# Patient Record
Sex: FEMALE | Race: NATIVE HAWAIIAN OR OTHER PACIFIC ISLANDER | URBAN - METROPOLITAN AREA
[De-identification: names, ages, dates, MRNs, and addresses within clinical notes are randomized per-mention and may not be internally consistent; named-entity substitution may affect disease eponyms.]

---

## 2017-12-28 ENCOUNTER — OP HISTORICAL/CONVERTED ENCOUNTER (OUTPATIENT)
Dept: OTHER | Age: 72
End: 2017-12-28

## 2023-04-17 RX ORDER — SPIRONOLACTONE 25 MG/1
25 TABLET ORAL DAILY
COMMUNITY

## 2023-04-17 RX ORDER — CHOLECALCIFEROL TAB 125 MCG (5000 UNIT) 125 MCG
5000 TAB ORAL DAILY
COMMUNITY

## 2023-04-17 RX ORDER — METFORMIN HYDROCHLORIDE 500 MG/1
500 TABLET ORAL
COMMUNITY

## 2023-04-17 RX ORDER — ATORVASTATIN CALCIUM 20 MG/1
20 TABLET, FILM COATED ORAL DAILY
COMMUNITY

## 2023-04-17 RX ORDER — LEVOTHYROXINE SODIUM 50 UG/1
50 TABLET ORAL
COMMUNITY

## 2023-04-17 RX ORDER — PHENOL/SODIUM PHENOLATE
20 AEROSOL, SPRAY (ML) MUCOUS MEMBRANE DAILY
COMMUNITY

## 2023-04-17 RX ORDER — HYDROCHLOROTHIAZIDE 25 MG/1
25 TABLET ORAL DAILY
COMMUNITY

## 2023-04-17 RX ORDER — METOPROLOL TARTRATE 25 MG/1
25 TABLET, FILM COATED ORAL DAILY
COMMUNITY

## 2023-04-18 NOTE — PERIOP NOTES
Returned patient's call this morning pt verbalized understanding to hold Metformin & Eliquis for 2 days, she wanted to confirm it was OK to take metoprolol and levothyroxine morning of procedure.

## 2023-04-19 ENCOUNTER — HOSPITAL ENCOUNTER (OUTPATIENT)
Age: 78
Discharge: HOME | End: 2023-04-19
Attending: STUDENT IN AN ORGANIZED HEALTH CARE EDUCATION/TRAINING PROGRAM | Admitting: STUDENT IN AN ORGANIZED HEALTH CARE EDUCATION/TRAINING PROGRAM
Payer: MEDICARE

## 2023-04-19 VITALS
WEIGHT: 200 LBS | BODY MASS INDEX: 34.15 KG/M2 | DIASTOLIC BLOOD PRESSURE: 66 MMHG | RESPIRATION RATE: 18 BRPM | HEART RATE: 64 BPM | HEIGHT: 64 IN | OXYGEN SATURATION: 95 % | SYSTOLIC BLOOD PRESSURE: 147 MMHG

## 2023-04-19 DIAGNOSIS — I48.91 ATRIAL FIBRILLATION, UNSPECIFIED TYPE (HCC): ICD-10-CM

## 2023-04-19 LAB
ALBUMIN SERPL-MCNC: 3.8 G/DL (ref 3.5–5)
ALBUMIN/GLOB SERPL: 1.1 (ref 1.1–2.2)
ALP SERPL-CCNC: 79 U/L (ref 45–117)
ALT SERPL-CCNC: 30 U/L (ref 12–78)
ANION GAP SERPL CALC-SCNC: 3 MMOL/L (ref 5–15)
APTT PPP: 27.8 SEC (ref 21.2–34.1)
AST SERPL W P-5'-P-CCNC: 17 U/L (ref 15–37)
ATRIAL RATE: 58 BPM
BASOPHILS # BLD: 0 K/UL (ref 0–0.1)
BASOPHILS NFR BLD: 0 % (ref 0–1)
BILIRUB SERPL-MCNC: 1 MG/DL (ref 0.2–1)
BUN SERPL-MCNC: 20 MG/DL (ref 6–20)
BUN/CREAT SERPL: 31 (ref 12–20)
CA-I BLD-MCNC: 9.3 MG/DL (ref 8.5–10.1)
CALCULATED P AXIS, ECG09: -9 DEGREES
CALCULATED R AXIS, ECG10: 3 DEGREES
CALCULATED T AXIS, ECG11: 31 DEGREES
CHLORIDE SERPL-SCNC: 103 MMOL/L (ref 97–108)
CO2 SERPL-SCNC: 32 MMOL/L (ref 21–32)
CREAT SERPL-MCNC: 0.64 MG/DL (ref 0.55–1.02)
DIAGNOSIS, 93000: NORMAL
DIFFERENTIAL METHOD BLD: NORMAL
EOSINOPHIL # BLD: 0.1 K/UL (ref 0–0.4)
EOSINOPHIL NFR BLD: 2 % (ref 0–7)
ERYTHROCYTE [DISTWIDTH] IN BLOOD BY AUTOMATED COUNT: 14.2 % (ref 11.5–14.5)
GLOBULIN SER CALC-MCNC: 3.5 G/DL (ref 2–4)
GLUCOSE SERPL-MCNC: 113 MG/DL (ref 65–100)
HCT VFR BLD AUTO: 37.9 % (ref 35–47)
HGB BLD-MCNC: 12.4 G/DL (ref 11.5–16)
IMM GRANULOCYTES # BLD AUTO: 0 K/UL (ref 0–0.04)
IMM GRANULOCYTES NFR BLD AUTO: 0 % (ref 0–0.5)
INR PPP: 1 (ref 0.9–1.1)
LYMPHOCYTES # BLD: 1.3 K/UL (ref 0.8–3.5)
LYMPHOCYTES NFR BLD: 30 % (ref 12–49)
MCH RBC QN AUTO: 29 PG (ref 26–34)
MCHC RBC AUTO-ENTMCNC: 32.7 G/DL (ref 30–36.5)
MCV RBC AUTO: 88.6 FL (ref 80–99)
MONOCYTES # BLD: 0.3 K/UL (ref 0–1)
MONOCYTES NFR BLD: 8 % (ref 5–13)
NEUTS SEG # BLD: 2.5 K/UL (ref 1.8–8)
NEUTS SEG NFR BLD: 60 % (ref 32–75)
NRBC # BLD: 0 K/UL (ref 0–0.01)
NRBC BLD-RTO: 0 PER 100 WBC
P-R INTERVAL, ECG05: 148 MS
PLATELET # BLD AUTO: 205 K/UL (ref 150–400)
PMV BLD AUTO: 9.4 FL (ref 8.9–12.9)
POTASSIUM SERPL-SCNC: 4 MMOL/L (ref 3.5–5.1)
PROT SERPL-MCNC: 7.3 G/DL (ref 6.4–8.2)
PROTHROMBIN TIME: 13.7 SEC (ref 11.9–14.6)
Q-T INTERVAL, ECG07: 364 MS
QRS DURATION, ECG06: 70 MS
QTC CALCULATION (BEZET), ECG08: 357 MS
RBC # BLD AUTO: 4.28 M/UL (ref 3.8–5.2)
SODIUM SERPL-SCNC: 138 MMOL/L (ref 136–145)
THERAPEUTIC RANGE,PTTT: NORMAL SEC (ref 82–109)
VENTRICULAR RATE, ECG03: 58 BPM
WBC # BLD AUTO: 4.2 K/UL (ref 3.6–11)

## 2023-04-19 PROCEDURE — 77030029997 HC DEV COM RDL R BND TELE -B: Performed by: STUDENT IN AN ORGANIZED HEALTH CARE EDUCATION/TRAINING PROGRAM

## 2023-04-19 PROCEDURE — C1894 INTRO/SHEATH, NON-LASER: HCPCS | Performed by: STUDENT IN AN ORGANIZED HEALTH CARE EDUCATION/TRAINING PROGRAM

## 2023-04-19 PROCEDURE — 76210000000 HC OR PH I REC 2 TO 2.5 HR: Performed by: STUDENT IN AN ORGANIZED HEALTH CARE EDUCATION/TRAINING PROGRAM

## 2023-04-19 PROCEDURE — 80053 COMPREHEN METABOLIC PANEL: CPT

## 2023-04-19 PROCEDURE — 76210000020 HC REC RM PH II FIRST 0.5 HR: Performed by: STUDENT IN AN ORGANIZED HEALTH CARE EDUCATION/TRAINING PROGRAM

## 2023-04-19 PROCEDURE — 36415 COLL VENOUS BLD VENIPUNCTURE: CPT

## 2023-04-19 PROCEDURE — 85025 COMPLETE CBC W/AUTO DIFF WBC: CPT

## 2023-04-19 PROCEDURE — C1769 GUIDE WIRE: HCPCS | Performed by: STUDENT IN AN ORGANIZED HEALTH CARE EDUCATION/TRAINING PROGRAM

## 2023-04-19 PROCEDURE — 93005 ELECTROCARDIOGRAM TRACING: CPT

## 2023-04-19 PROCEDURE — 77030040934 HC CATH DIAG DXTERITY MEDT -A: Performed by: STUDENT IN AN ORGANIZED HEALTH CARE EDUCATION/TRAINING PROGRAM

## 2023-04-19 PROCEDURE — 85610 PROTHROMBIN TIME: CPT

## 2023-04-19 PROCEDURE — 2709999900 HC NON-CHARGEABLE SUPPLY: Performed by: STUDENT IN AN ORGANIZED HEALTH CARE EDUCATION/TRAINING PROGRAM

## 2023-04-19 PROCEDURE — 99152 MOD SED SAME PHYS/QHP 5/>YRS: CPT | Performed by: STUDENT IN AN ORGANIZED HEALTH CARE EDUCATION/TRAINING PROGRAM

## 2023-04-19 PROCEDURE — 74011250636 HC RX REV CODE- 250/636: Performed by: STUDENT IN AN ORGANIZED HEALTH CARE EDUCATION/TRAINING PROGRAM

## 2023-04-19 PROCEDURE — 74011000636 HC RX REV CODE- 636: Performed by: STUDENT IN AN ORGANIZED HEALTH CARE EDUCATION/TRAINING PROGRAM

## 2023-04-19 PROCEDURE — 93458 L HRT ARTERY/VENTRICLE ANGIO: CPT | Performed by: STUDENT IN AN ORGANIZED HEALTH CARE EDUCATION/TRAINING PROGRAM

## 2023-04-19 PROCEDURE — 77030019699 HC SYR ANGI MDLON MRTM -B: Performed by: STUDENT IN AN ORGANIZED HEALTH CARE EDUCATION/TRAINING PROGRAM

## 2023-04-19 PROCEDURE — 85730 THROMBOPLASTIN TIME PARTIAL: CPT

## 2023-04-19 PROCEDURE — 74011000250 HC RX REV CODE- 250: Performed by: STUDENT IN AN ORGANIZED HEALTH CARE EDUCATION/TRAINING PROGRAM

## 2023-04-19 RX ORDER — LIDOCAINE HYDROCHLORIDE 10 MG/ML
INJECTION INFILTRATION; PERINEURAL AS NEEDED
Status: DISCONTINUED | OUTPATIENT
Start: 2023-04-19 | End: 2023-04-19 | Stop reason: HOSPADM

## 2023-04-19 RX ORDER — VERAPAMIL HYDROCHLORIDE 2.5 MG/ML
INJECTION, SOLUTION INTRAVENOUS AS NEEDED
Status: DISCONTINUED | OUTPATIENT
Start: 2023-04-19 | End: 2023-04-19 | Stop reason: HOSPADM

## 2023-04-19 RX ORDER — SODIUM CHLORIDE 9 MG/ML
50 INJECTION, SOLUTION INTRAVENOUS CONTINUOUS
Status: DISCONTINUED | OUTPATIENT
Start: 2023-04-19 | End: 2023-04-19 | Stop reason: HOSPADM

## 2023-04-19 RX ORDER — SODIUM CHLORIDE 0.9 % (FLUSH) 0.9 %
5-40 SYRINGE (ML) INJECTION EVERY 8 HOURS
Status: DISCONTINUED | OUTPATIENT
Start: 2023-04-19 | End: 2023-04-19 | Stop reason: HOSPADM

## 2023-04-19 RX ORDER — SODIUM CHLORIDE 0.9 % (FLUSH) 0.9 %
5-40 SYRINGE (ML) INJECTION AS NEEDED
Status: DISCONTINUED | OUTPATIENT
Start: 2023-04-19 | End: 2023-04-19 | Stop reason: HOSPADM

## 2023-04-19 RX ORDER — NITROGLYCERIN 5 MG/ML
INJECTION, SOLUTION INTRAVENOUS AS NEEDED
Status: DISCONTINUED | OUTPATIENT
Start: 2023-04-19 | End: 2023-04-19 | Stop reason: HOSPADM

## 2023-04-19 RX ORDER — HEPARIN SODIUM 1000 [USP'U]/ML
INJECTION, SOLUTION INTRAVENOUS; SUBCUTANEOUS AS NEEDED
Status: DISCONTINUED | OUTPATIENT
Start: 2023-04-19 | End: 2023-04-19 | Stop reason: HOSPADM

## 2023-04-19 RX ORDER — MIDAZOLAM HYDROCHLORIDE 1 MG/ML
INJECTION INTRAMUSCULAR; INTRAVENOUS AS NEEDED
Status: DISCONTINUED | OUTPATIENT
Start: 2023-04-19 | End: 2023-04-19 | Stop reason: HOSPADM

## 2023-04-19 RX ORDER — FENTANYL CITRATE 50 UG/ML
INJECTION, SOLUTION INTRAMUSCULAR; INTRAVENOUS AS NEEDED
Status: DISCONTINUED | OUTPATIENT
Start: 2023-04-19 | End: 2023-04-19 | Stop reason: HOSPADM

## 2023-04-19 RX ADMIN — SODIUM CHLORIDE 50 ML/HR: 9 INJECTION, SOLUTION INTRAVENOUS at 13:47

## 2023-04-19 NOTE — DISCHARGE INSTRUCTIONS
[facility]  Cardiac Catheterization Department  [address], [address] [facility]  [phone]        Coronary Angiogram: What to Expect at [address] [facility]  A coronary angiogram is a test to examine the large blood vessels of your heart (coronary arteries). The doctor inserted a thin, flexible tube (catheter into a blood vessel in your groin or wrist.  Your groin or wrist may have a bruise and feel sore for a few days after the procedure. You can do light activities around the house. But do not do anything strenuous until your doctor says it is ok. This may be for several days. This care sheet gives you a general idea about how long it will take for you to recover. But each person recovers at a different pace. Follow the steps below to feel better as quickly as possible. How can you care for yourself at home? Activity  If the doctor gave you a sedative: For 24 hours, don't do anything that requires attention to detail, such as going to work, making important decisions, or signing any legal documents. It takes time for the medicine's effects to completely wear off. For your safety, do not drive or operate any machinery that could be dangerous. Wait until the medicine wears off and you can think clearly and react easily. Do not do any strenuous exercise and do not lift, pull, or push anything heavier than 5 pounds (approximately the weight of 1 gallon of milk) until your doctor says it is ok. This may be for several days. You can walk around the house and do light activity, such as cooking. If the catheter was placed in your groin and you must use stairs, just go up and down slowly in as few trips as possible for the first couple of days. If the catheter was placed in your wrist, do not bend your wrist deeply for the first couple of days. A soft wrist-splint may be placed on your wrist as a reminder following the procedure.   Be careful using your hand to get into and out of a chair or bed and when opening doors. Get regular exercise, after being cleared by your cardiologist.  Walking may be a good choice. Try for at least 30 minutes on most days of the week. If you smoke or use smokeless tobacco products, including vaping products, it is extremely important that you quit using these products. Please ask you nurse or doctor for more information about smoking cessation. Diet  Drink plenty of fluids to help you body flush out the dye. If you have kidney, heart, or liver disease and have to limit fluids, talk to your doctor before increasing the amount of fluids you drink. Keep eating a heart-healthy diet that has lots of fruits, vegetables, and whole grains. If you have not been eating this way, talk to your doctor. You also may want to talk to a dietician. This expert can help you to learn about healthy foods and plan meals. Medicines  Your doctor will tell you if and when you can restart your medicines. You will also be given instructions about taking any new medicines. Take these medicines as prescribed. Continue taking your cholesterol lowering medications (statins), if you have been prescribed this. You doctor may prescribe a blood-thinning medicine like aspirin or clopidogrel (Plavix), plasugrel (Effient), or ticagrelor (Brilinta). If you had angioplasty or a stent placement, you must continue aspirin and Plavix, Effient, or Brilinta. It is very important that you take these medicines exactly as directed to keep the coronary artery open and reduce your risk of heart attack. Be safe with medicines. Call your doctor if you think you are having a problem with taking or obtaining your medicines, notify your doctor immediately. You cannot afford to miss your medications. Do not stop taking these medications without speaking to your cardiologist first.   Do not strain to have a bowel movement.   Ask your doctor if you feel you may need a stool softener of laxative. Care of the catheter site  For 1 or 2 days, keep a bandage over the spot where the catheter(s) was inserted. The bandage probably will fall off in this time. Put ice or a cold pack on the area for 10 to 20 minutes at a time to help with soreness of swelling. Place a thin cloth between the ice and your skin. You may shower 24 to 48 hours after the procedure, if your doctor says it is okay. Pat the incision dry with a clean towel afterwards. Do not soak the catheter site until it is healed. Don't take a bath for 1 week, or until your doctor tells you it is okay to do so. The use of lotions and powders is not recommended at the site until it is completely healed. Watch for bleeding from the site. A small amount of blood (up to the size of a quarter) on the bandage can be normal.  If you cough, sneeze, or laugh vigorously, apply direct pressure with your hand over the catheter site. If you notice a little bit of blood from the catheter site (similar to a paper cut or shaving nick), lie down and press firmly on the area for 15 minutes to try and make it stop bleeding. If the bleeding does not stop, call your doctor or seek immediate medical care. If you notice heavy bleeding at the site that has either saturated the bandage or is squirting, lie down, press firmly on the site, and have someone call 911. Follow-up care is a key part of your treatment and safety. Be sure to make and go to all appointments and call your doctor if you are having problems. It's also a good idea to know your test results and keep a list of medicines you take. When should you call for help? Call 911 anytime you think you may need emergency care. For example, call if:  You passed out (lost consciousness). You have severe trouble breathing. You have sudden chest pain and shortness of breath, or you cough up blood.   Call 911 if you have symptoms of a heart attack, such as:  Chest Pain, pressure, squeezing, or burning. Sweating. Shortness of breath or difficulty breathing. Nausea or vomiting. Jaw pain, shoulder pain, or arm pain in one or both sides. Feelings of fullness. Excessive fatigue or weakness. New onset anxiety. New onset of upper back pain. Dizziness or lightheadedness. A fast or uneven pulse. Call 911 if you have been diagnosed with angina, and you have symptoms that do not go away with rest or are not getting better within 5 minutes of taking a dose of your nitroglycerin. After you call 911, the  may tell you to chew 1 adult-strength (325 mg) of 2 to 4 low-dose aspirin (81 mg). Wait for ambulance. Do not drive yourself. Call your doctor now or seek immediate medical care if:  You are bleeding from the area where the catheter was inserted. You have a fast-growing, painful lump at the catheter site. You have signs of infection, such as  Increased pain, swelling, warmth or redness at the catheter site. Red streaks leading from the catheter site. Pus draining from the catheter site. A fever. Your leg or hand is painful, looks blue, or feels cold, numb, or tingly. Watch closely for changes in your health and be sure to contact your doctor if you have any problems.

## 2023-04-19 NOTE — ROUTINE PROCESS
IV removed-- tip intact. No redness swelling or bleeding noted. Pt in no acute distress and VSS. Right radial wrist site has dressing that is clean, dry and intact with no bleeding or shadowing noted. Positive cap refill and +2 radial pulse. DC instructions reviewed with patient and son, Zaid Guzman. Pt to be wheeled out to private vehicle.

## 2023-05-06 ENCOUNTER — APPOINTMENT (OUTPATIENT)
Facility: HOSPITAL | Age: 78
DRG: 309 | End: 2023-05-06
Payer: MEDICARE

## 2023-05-06 ENCOUNTER — HOSPITAL ENCOUNTER (INPATIENT)
Facility: HOSPITAL | Age: 78
LOS: 5 days | Discharge: HOME | DRG: 309 | End: 2023-05-11
Attending: STUDENT IN AN ORGANIZED HEALTH CARE EDUCATION/TRAINING PROGRAM | Admitting: HOSPITALIST
Payer: MEDICARE

## 2023-05-06 DIAGNOSIS — I48.0 PAROXYSMAL A-FIB (HCC): ICD-10-CM

## 2023-05-06 DIAGNOSIS — I48.91 ATRIAL FIBRILLATION WITH RAPID VENTRICULAR RESPONSE (HCC): Primary | ICD-10-CM

## 2023-05-06 LAB
ALBUMIN SERPL-MCNC: 3.4 G/DL (ref 3.5–5)
ALBUMIN/GLOB SERPL: 1 (ref 1.1–2.2)
ALP SERPL-CCNC: 79 U/L (ref 45–117)
ALT SERPL-CCNC: 29 U/L (ref 12–78)
ANION GAP SERPL CALC-SCNC: 4 MMOL/L (ref 5–15)
AST SERPL W P-5'-P-CCNC: 16 U/L (ref 15–37)
BASOPHILS # BLD: 0 K/UL (ref 0–0.1)
BASOPHILS NFR BLD: 0 % (ref 0–1)
BILIRUB SERPL-MCNC: 0.5 MG/DL (ref 0.2–1)
BUN SERPL-MCNC: 17 MG/DL (ref 6–20)
BUN/CREAT SERPL: 27 (ref 12–20)
CA-I BLD-MCNC: 8.3 MG/DL (ref 8.5–10.1)
CHLORIDE SERPL-SCNC: 108 MMOL/L (ref 97–108)
CO2 SERPL-SCNC: 27 MMOL/L (ref 21–32)
CREAT SERPL-MCNC: 0.63 MG/DL (ref 0.55–1.02)
DIFFERENTIAL METHOD BLD: NORMAL
EOSINOPHIL # BLD: 0.1 K/UL (ref 0–0.4)
EOSINOPHIL NFR BLD: 1 % (ref 0–7)
ERYTHROCYTE [DISTWIDTH] IN BLOOD BY AUTOMATED COUNT: 14.2 % (ref 11.5–14.5)
GLOBULIN SER CALC-MCNC: 3.3 G/DL (ref 2–4)
GLUCOSE BLD STRIP.AUTO-MCNC: 113 MG/DL (ref 65–100)
GLUCOSE BLD STRIP.AUTO-MCNC: 114 MG/DL (ref 65–100)
GLUCOSE BLD STRIP.AUTO-MCNC: 125 MG/DL (ref 65–100)
GLUCOSE SERPL-MCNC: 140 MG/DL (ref 65–100)
HCT VFR BLD AUTO: 37.7 % (ref 35–47)
HGB BLD-MCNC: 12.1 G/DL (ref 11.5–16)
IMM GRANULOCYTES # BLD AUTO: 0 K/UL (ref 0–0.04)
IMM GRANULOCYTES NFR BLD AUTO: 0 % (ref 0–0.5)
LYMPHOCYTES # BLD: 1.2 K/UL (ref 0.8–3.5)
LYMPHOCYTES NFR BLD: 25 % (ref 12–49)
MCH RBC QN AUTO: 28.6 PG (ref 26–34)
MCHC RBC AUTO-ENTMCNC: 32.1 G/DL (ref 30–36.5)
MCV RBC AUTO: 89.1 FL (ref 80–99)
MONOCYTES # BLD: 0.3 K/UL (ref 0–1)
MONOCYTES NFR BLD: 7 % (ref 5–13)
NEUTS SEG # BLD: 3.3 K/UL (ref 1.8–8)
NEUTS SEG NFR BLD: 67 % (ref 32–75)
NRBC # BLD: 0 K/UL (ref 0–0.01)
NRBC BLD-RTO: 0 PER 100 WBC
PERFORMED BY:: ABNORMAL
PLATELET # BLD AUTO: 211 K/UL (ref 150–400)
PMV BLD AUTO: 9.6 FL (ref 8.9–12.9)
POTASSIUM SERPL-SCNC: 3.6 MMOL/L (ref 3.5–5.1)
PROT SERPL-MCNC: 6.7 G/DL (ref 6.4–8.2)
RBC # BLD AUTO: 4.23 M/UL (ref 3.8–5.2)
SODIUM SERPL-SCNC: 139 MMOL/L (ref 136–145)
TROPONIN I SERPL HS-MCNC: 17 NG/L (ref 0–51)
TROPONIN I SERPL HS-MCNC: 18 NG/L (ref 0–51)
WBC # BLD AUTO: 4.9 K/UL (ref 3.6–11)

## 2023-05-06 PROCEDURE — 2580000003 HC RX 258: Performed by: HOSPITALIST

## 2023-05-06 PROCEDURE — 6370000000 HC RX 637 (ALT 250 FOR IP): Performed by: HOSPITALIST

## 2023-05-06 PROCEDURE — 2000000000 HC ICU R&B

## 2023-05-06 PROCEDURE — 93005 ELECTROCARDIOGRAM TRACING: CPT | Performed by: STUDENT IN AN ORGANIZED HEALTH CARE EDUCATION/TRAINING PROGRAM

## 2023-05-06 PROCEDURE — 2580000003 HC RX 258: Performed by: STUDENT IN AN ORGANIZED HEALTH CARE EDUCATION/TRAINING PROGRAM

## 2023-05-06 PROCEDURE — 6370000000 HC RX 637 (ALT 250 FOR IP): Performed by: STUDENT IN AN ORGANIZED HEALTH CARE EDUCATION/TRAINING PROGRAM

## 2023-05-06 PROCEDURE — 2500000003 HC RX 250 WO HCPCS: Performed by: STUDENT IN AN ORGANIZED HEALTH CARE EDUCATION/TRAINING PROGRAM

## 2023-05-06 PROCEDURE — 36415 COLL VENOUS BLD VENIPUNCTURE: CPT

## 2023-05-06 PROCEDURE — 71045 X-RAY EXAM CHEST 1 VIEW: CPT

## 2023-05-06 PROCEDURE — 85025 COMPLETE CBC W/AUTO DIFF WBC: CPT

## 2023-05-06 PROCEDURE — 84484 ASSAY OF TROPONIN QUANT: CPT

## 2023-05-06 PROCEDURE — 2500000003 HC RX 250 WO HCPCS: Performed by: HOSPITALIST

## 2023-05-06 PROCEDURE — 80053 COMPREHEN METABOLIC PANEL: CPT

## 2023-05-06 PROCEDURE — 96374 THER/PROPH/DIAG INJ IV PUSH: CPT

## 2023-05-06 PROCEDURE — 82962 GLUCOSE BLOOD TEST: CPT

## 2023-05-06 PROCEDURE — 96361 HYDRATE IV INFUSION ADD-ON: CPT

## 2023-05-06 PROCEDURE — 94761 N-INVAS EAR/PLS OXIMETRY MLT: CPT

## 2023-05-06 PROCEDURE — 99285 EMERGENCY DEPT VISIT HI MDM: CPT

## 2023-05-06 RX ORDER — METOPROLOL TARTRATE 5 MG/5ML
5 INJECTION INTRAVENOUS ONCE
Status: COMPLETED | OUTPATIENT
Start: 2023-05-06 | End: 2023-05-06

## 2023-05-06 RX ORDER — SODIUM CHLORIDE 9 MG/ML
INJECTION, SOLUTION INTRAVENOUS PRN
Status: DISCONTINUED | OUTPATIENT
Start: 2023-05-06 | End: 2023-05-11 | Stop reason: HOSPADM

## 2023-05-06 RX ORDER — ACETAMINOPHEN 325 MG/1
650 TABLET ORAL EVERY 6 HOURS PRN
Status: DISCONTINUED | OUTPATIENT
Start: 2023-05-06 | End: 2023-05-11 | Stop reason: HOSPADM

## 2023-05-06 RX ORDER — ACETAMINOPHEN 650 MG/1
650 SUPPOSITORY RECTAL EVERY 6 HOURS PRN
Status: DISCONTINUED | OUTPATIENT
Start: 2023-05-06 | End: 2023-05-11 | Stop reason: HOSPADM

## 2023-05-06 RX ORDER — SODIUM CHLORIDE 0.9 % (FLUSH) 0.9 %
5-40 SYRINGE (ML) INJECTION EVERY 12 HOURS SCHEDULED
Status: DISCONTINUED | OUTPATIENT
Start: 2023-05-06 | End: 2023-05-11 | Stop reason: HOSPADM

## 2023-05-06 RX ORDER — SODIUM CHLORIDE 0.9 % (FLUSH) 0.9 %
5-40 SYRINGE (ML) INJECTION PRN
Status: DISCONTINUED | OUTPATIENT
Start: 2023-05-06 | End: 2023-05-11 | Stop reason: HOSPADM

## 2023-05-06 RX ORDER — DEXTROSE MONOHYDRATE 100 MG/ML
INJECTION, SOLUTION INTRAVENOUS CONTINUOUS PRN
Status: DISCONTINUED | OUTPATIENT
Start: 2023-05-06 | End: 2023-05-11 | Stop reason: HOSPADM

## 2023-05-06 RX ORDER — SPIRONOLACTONE 25 MG/1
25 TABLET ORAL DAILY
Status: ON HOLD | COMMUNITY

## 2023-05-06 RX ORDER — INSULIN LISPRO 100 [IU]/ML
0-4 INJECTION, SOLUTION INTRAVENOUS; SUBCUTANEOUS
Status: DISCONTINUED | OUTPATIENT
Start: 2023-05-06 | End: 2023-05-11 | Stop reason: HOSPADM

## 2023-05-06 RX ORDER — CHOLESTYRAMINE LIGHT 4 G/5.7G
5.5 POWDER, FOR SUSPENSION ORAL DAILY PRN
Status: ON HOLD | COMMUNITY
Start: 2021-11-05

## 2023-05-06 RX ORDER — 0.9 % SODIUM CHLORIDE 0.9 %
500 INTRAVENOUS SOLUTION INTRAVENOUS ONCE
Status: COMPLETED | OUTPATIENT
Start: 2023-05-06 | End: 2023-05-06

## 2023-05-06 RX ORDER — ATORVASTATIN CALCIUM 20 MG/1
20 TABLET, FILM COATED ORAL DAILY
Status: ON HOLD | COMMUNITY
Start: 2021-09-20

## 2023-05-06 RX ORDER — METOPROLOL TARTRATE 5 MG/5ML
5 INJECTION INTRAVENOUS EVERY 6 HOURS
Status: DISCONTINUED | OUTPATIENT
Start: 2023-05-06 | End: 2023-05-06

## 2023-05-06 RX ORDER — ONDANSETRON 2 MG/ML
4 INJECTION INTRAMUSCULAR; INTRAVENOUS EVERY 6 HOURS PRN
Status: DISCONTINUED | OUTPATIENT
Start: 2023-05-06 | End: 2023-05-11 | Stop reason: HOSPADM

## 2023-05-06 RX ORDER — ONDANSETRON 4 MG/1
4 TABLET, ORALLY DISINTEGRATING ORAL EVERY 8 HOURS PRN
Status: DISCONTINUED | OUTPATIENT
Start: 2023-05-06 | End: 2023-05-11 | Stop reason: HOSPADM

## 2023-05-06 RX ORDER — OMEPRAZOLE 40 MG/1
40 CAPSULE, DELAYED RELEASE ORAL DAILY
Status: ON HOLD | COMMUNITY
Start: 2015-07-05

## 2023-05-06 RX ORDER — CHOLECALCIFEROL (VITAMIN D3) 125 MCG
500 CAPSULE ORAL DAILY
Status: ON HOLD | COMMUNITY

## 2023-05-06 RX ORDER — INSULIN LISPRO 100 [IU]/ML
0-4 INJECTION, SOLUTION INTRAVENOUS; SUBCUTANEOUS NIGHTLY
Status: DISCONTINUED | OUTPATIENT
Start: 2023-05-06 | End: 2023-05-11 | Stop reason: HOSPADM

## 2023-05-06 RX ORDER — LEVOTHYROXINE SODIUM 0.05 MG/1
50 TABLET ORAL
Status: ON HOLD | COMMUNITY
Start: 2015-07-05

## 2023-05-06 RX ORDER — METOPROLOL TARTRATE 50 MG/1
50 TABLET, FILM COATED ORAL 2 TIMES DAILY
Status: DISCONTINUED | OUTPATIENT
Start: 2023-05-06 | End: 2023-05-08

## 2023-05-06 RX ORDER — LEVOTHYROXINE SODIUM 0.07 MG/1
TABLET ORAL DAILY
Status: ON HOLD | COMMUNITY
End: 2023-05-06 | Stop reason: SDUPTHER

## 2023-05-06 RX ORDER — GLUCOSAMINE HCL 500 MG
TABLET ORAL
Status: ON HOLD | COMMUNITY
End: 2023-05-11 | Stop reason: HOSPADM

## 2023-05-06 RX ORDER — METOPROLOL SUCCINATE 25 MG/1
25 TABLET, EXTENDED RELEASE ORAL DAILY
Status: ON HOLD | COMMUNITY
End: 2023-05-06 | Stop reason: SDUPTHER

## 2023-05-06 RX ORDER — POLYETHYLENE GLYCOL 3350 17 G/17G
17 POWDER, FOR SOLUTION ORAL DAILY PRN
Status: DISCONTINUED | OUTPATIENT
Start: 2023-05-06 | End: 2023-05-11 | Stop reason: HOSPADM

## 2023-05-06 RX ORDER — SPIRONOLACTONE 25 MG/1
25 TABLET ORAL DAILY
Status: ON HOLD | COMMUNITY
End: 2023-05-06 | Stop reason: SDUPTHER

## 2023-05-06 RX ADMIN — ACETAMINOPHEN 650 MG: 325 TABLET ORAL at 21:00

## 2023-05-06 RX ADMIN — DILTIAZEM HYDROCHLORIDE 5 MG/HR: 5 INJECTION INTRAVENOUS at 14:17

## 2023-05-06 RX ADMIN — METOPROLOL TARTRATE 50 MG: 50 TABLET, FILM COATED ORAL at 11:42

## 2023-05-06 RX ADMIN — SODIUM CHLORIDE 500 ML: 9 INJECTION, SOLUTION INTRAVENOUS at 09:34

## 2023-05-06 RX ADMIN — METOPROLOL TARTRATE 5 MG: 5 INJECTION INTRAVENOUS at 10:13

## 2023-05-06 RX ADMIN — SODIUM CHLORIDE, PRESERVATIVE FREE 10 ML: 5 INJECTION INTRAVENOUS at 21:05

## 2023-05-06 RX ADMIN — APIXABAN 5 MG: 5 TABLET, FILM COATED ORAL at 20:55

## 2023-05-06 RX ADMIN — METOPROLOL TARTRATE 50 MG: 50 TABLET, FILM COATED ORAL at 20:55

## 2023-05-06 RX ADMIN — METOPROLOL TARTRATE 5 MG: 5 INJECTION INTRAVENOUS at 09:43

## 2023-05-06 ASSESSMENT — PAIN SCALES - GENERAL
PAINLEVEL_OUTOF10: 0
PAINLEVEL_OUTOF10: 0

## 2023-05-06 ASSESSMENT — PAIN - FUNCTIONAL ASSESSMENT: PAIN_FUNCTIONAL_ASSESSMENT: NONE - DENIES PAIN

## 2023-05-06 ASSESSMENT — LIFESTYLE VARIABLES
HOW OFTEN DO YOU HAVE A DRINK CONTAINING ALCOHOL: NEVER
HOW MANY STANDARD DRINKS CONTAINING ALCOHOL DO YOU HAVE ON A TYPICAL DAY: PATIENT DOES NOT DRINK
HOW OFTEN DO YOU HAVE A DRINK CONTAINING ALCOHOL: NEVER

## 2023-05-07 LAB
ALBUMIN SERPL-MCNC: 2.7 G/DL (ref 3.5–5)
ANION GAP SERPL CALC-SCNC: 5 MMOL/L (ref 5–15)
BASOPHILS # BLD: 0 K/UL (ref 0–0.1)
BASOPHILS NFR BLD: 0 % (ref 0–1)
BNP SERPL-MCNC: 2824 PG/ML
BUN SERPL-MCNC: 14 MG/DL (ref 6–20)
BUN/CREAT SERPL: 27 (ref 12–20)
CA-I BLD-MCNC: 7.4 MG/DL (ref 8.5–10.1)
CHLORIDE SERPL-SCNC: 110 MMOL/L (ref 97–108)
CO2 SERPL-SCNC: 26 MMOL/L (ref 21–32)
CREAT SERPL-MCNC: 0.51 MG/DL (ref 0.55–1.02)
DIFFERENTIAL METHOD BLD: ABNORMAL
EKG ATRIAL RATE: 174 BPM
EKG DIAGNOSIS: NORMAL
EKG Q-T INTERVAL: 272 MS
EKG QRS DURATION: 72 MS
EKG QTC CALCULATION (BAZETT): 429 MS
EKG R AXIS: -2 DEGREES
EKG T AXIS: 43 DEGREES
EKG VENTRICULAR RATE: 150 BPM
EOSINOPHIL # BLD: 0.1 K/UL (ref 0–0.4)
EOSINOPHIL NFR BLD: 2 % (ref 0–7)
ERYTHROCYTE [DISTWIDTH] IN BLOOD BY AUTOMATED COUNT: 14.3 % (ref 11.5–14.5)
GLUCOSE BLD STRIP.AUTO-MCNC: 107 MG/DL (ref 65–100)
GLUCOSE BLD STRIP.AUTO-MCNC: 128 MG/DL (ref 65–100)
GLUCOSE BLD STRIP.AUTO-MCNC: 144 MG/DL (ref 65–100)
GLUCOSE BLD STRIP.AUTO-MCNC: 90 MG/DL (ref 65–100)
GLUCOSE SERPL-MCNC: 98 MG/DL (ref 65–100)
HCT VFR BLD AUTO: 34.6 % (ref 35–47)
HGB BLD-MCNC: 11.2 G/DL (ref 11.5–16)
IMM GRANULOCYTES # BLD AUTO: 0 K/UL (ref 0–0.04)
IMM GRANULOCYTES NFR BLD AUTO: 0 % (ref 0–0.5)
LYMPHOCYTES # BLD: 1.2 K/UL (ref 0.8–3.5)
LYMPHOCYTES NFR BLD: 32 % (ref 12–49)
MCH RBC QN AUTO: 28.9 PG (ref 26–34)
MCHC RBC AUTO-ENTMCNC: 32.4 G/DL (ref 30–36.5)
MCV RBC AUTO: 89.4 FL (ref 80–99)
MONOCYTES # BLD: 0.3 K/UL (ref 0–1)
MONOCYTES NFR BLD: 8 % (ref 5–13)
NEUTS SEG # BLD: 2.3 K/UL (ref 1.8–8)
NEUTS SEG NFR BLD: 58 % (ref 32–75)
NRBC # BLD: 0 K/UL (ref 0–0.01)
NRBC BLD-RTO: 0 PER 100 WBC
PERFORMED BY:: ABNORMAL
PERFORMED BY:: NORMAL
PHOSPHATE SERPL-MCNC: 2.8 MG/DL (ref 2.6–4.7)
PLATELET # BLD AUTO: 185 K/UL (ref 150–400)
PMV BLD AUTO: 10 FL (ref 8.9–12.9)
POTASSIUM SERPL-SCNC: ABNORMAL MMOL/L (ref 3.5–5.1)
RBC # BLD AUTO: 3.87 M/UL (ref 3.8–5.2)
SODIUM SERPL-SCNC: 141 MMOL/L (ref 136–145)
WBC # BLD AUTO: 3.9 K/UL (ref 3.6–11)

## 2023-05-07 PROCEDURE — 82962 GLUCOSE BLOOD TEST: CPT

## 2023-05-07 PROCEDURE — 85025 COMPLETE CBC W/AUTO DIFF WBC: CPT

## 2023-05-07 PROCEDURE — 2000000000 HC ICU R&B

## 2023-05-07 PROCEDURE — 6370000000 HC RX 637 (ALT 250 FOR IP): Performed by: HOSPITALIST

## 2023-05-07 PROCEDURE — 36415 COLL VENOUS BLD VENIPUNCTURE: CPT

## 2023-05-07 PROCEDURE — 83880 ASSAY OF NATRIURETIC PEPTIDE: CPT

## 2023-05-07 PROCEDURE — 80069 RENAL FUNCTION PANEL: CPT

## 2023-05-07 PROCEDURE — 6370000000 HC RX 637 (ALT 250 FOR IP): Performed by: STUDENT IN AN ORGANIZED HEALTH CARE EDUCATION/TRAINING PROGRAM

## 2023-05-07 PROCEDURE — 2580000003 HC RX 258: Performed by: HOSPITALIST

## 2023-05-07 PROCEDURE — 6360000002 HC RX W HCPCS: Performed by: HOSPITALIST

## 2023-05-07 RX ORDER — ATORVASTATIN CALCIUM 20 MG/1
20 TABLET, FILM COATED ORAL DAILY
Status: DISCONTINUED | OUTPATIENT
Start: 2023-05-07 | End: 2023-05-11 | Stop reason: HOSPADM

## 2023-05-07 RX ORDER — CHOLECALCIFEROL (VITAMIN D3) 125 MCG
500 CAPSULE ORAL DAILY
Status: DISCONTINUED | OUTPATIENT
Start: 2023-05-07 | End: 2023-05-11 | Stop reason: HOSPADM

## 2023-05-07 RX ORDER — PANTOPRAZOLE SODIUM 40 MG/1
40 TABLET, DELAYED RELEASE ORAL NIGHTLY
Status: DISCONTINUED | OUTPATIENT
Start: 2023-05-07 | End: 2023-05-11 | Stop reason: HOSPADM

## 2023-05-07 RX ORDER — LEVOTHYROXINE SODIUM 0.03 MG/1
50 TABLET ORAL
Status: DISCONTINUED | OUTPATIENT
Start: 2023-05-08 | End: 2023-05-11 | Stop reason: HOSPADM

## 2023-05-07 RX ADMIN — PANTOPRAZOLE SODIUM 40 MG: 40 TABLET, DELAYED RELEASE ORAL at 20:59

## 2023-05-07 RX ADMIN — ATORVASTATIN CALCIUM 20 MG: 20 TABLET, FILM COATED ORAL at 11:43

## 2023-05-07 RX ADMIN — SODIUM CHLORIDE, PRESERVATIVE FREE 10 ML: 5 INJECTION INTRAVENOUS at 21:00

## 2023-05-07 RX ADMIN — CHOLECALCIFEROL TAB 125 MCG (5000 UNIT) 5000 UNITS: 125 TAB at 11:43

## 2023-05-07 RX ADMIN — CYANOCOBALAMIN TAB 1000 MCG 500 MCG: 1000 TAB at 11:43

## 2023-05-07 RX ADMIN — APIXABAN 5 MG: 5 TABLET, FILM COATED ORAL at 08:13

## 2023-05-07 RX ADMIN — DILTIAZEM HYDROCHLORIDE 30 MG: 30 TABLET, FILM COATED ORAL at 11:43

## 2023-05-07 RX ADMIN — APIXABAN 5 MG: 5 TABLET, FILM COATED ORAL at 20:59

## 2023-05-07 RX ADMIN — ACETAMINOPHEN 650 MG: 325 TABLET ORAL at 16:35

## 2023-05-07 RX ADMIN — METOPROLOL TARTRATE 50 MG: 50 TABLET, FILM COATED ORAL at 20:59

## 2023-05-07 RX ADMIN — DILTIAZEM HYDROCHLORIDE 30 MG: 30 TABLET, FILM COATED ORAL at 20:59

## 2023-05-07 RX ADMIN — SODIUM CHLORIDE, PRESERVATIVE FREE 10 ML: 5 INJECTION INTRAVENOUS at 08:12

## 2023-05-07 RX ADMIN — ACETAMINOPHEN 650 MG: 325 TABLET ORAL at 21:50

## 2023-05-07 RX ADMIN — METOPROLOL TARTRATE 50 MG: 50 TABLET, FILM COATED ORAL at 08:12

## 2023-05-07 RX ADMIN — ONDANSETRON 4 MG: 2 INJECTION INTRAMUSCULAR; INTRAVENOUS at 07:16

## 2023-05-07 ASSESSMENT — PAIN SCALES - GENERAL
PAINLEVEL_OUTOF10: 1
PAINLEVEL_OUTOF10: 3
PAINLEVEL_OUTOF10: 0
PAINLEVEL_OUTOF10: 3
PAINLEVEL_OUTOF10: 3

## 2023-05-07 ASSESSMENT — PAIN DESCRIPTION - DESCRIPTORS
DESCRIPTORS: ACHING

## 2023-05-07 ASSESSMENT — PAIN DESCRIPTION - LOCATION
LOCATION: HEAD

## 2023-05-07 ASSESSMENT — PAIN DESCRIPTION - ORIENTATION
ORIENTATION: ANTERIOR
ORIENTATION: OTHER (COMMENT)

## 2023-05-07 ASSESSMENT — PAIN DESCRIPTION - FREQUENCY: FREQUENCY: INTERMITTENT

## 2023-05-08 LAB
GLUCOSE BLD STRIP.AUTO-MCNC: 103 MG/DL (ref 65–100)
GLUCOSE BLD STRIP.AUTO-MCNC: 116 MG/DL (ref 65–100)
GLUCOSE BLD STRIP.AUTO-MCNC: 121 MG/DL (ref 65–100)
GLUCOSE BLD STRIP.AUTO-MCNC: 122 MG/DL (ref 65–100)
GLUCOSE BLD STRIP.AUTO-MCNC: 136 MG/DL (ref 65–100)
PERFORMED BY:: ABNORMAL

## 2023-05-08 PROCEDURE — 6370000000 HC RX 637 (ALT 250 FOR IP): Performed by: STUDENT IN AN ORGANIZED HEALTH CARE EDUCATION/TRAINING PROGRAM

## 2023-05-08 PROCEDURE — 2580000003 HC RX 258: Performed by: HOSPITALIST

## 2023-05-08 PROCEDURE — 97161 PT EVAL LOW COMPLEX 20 MIN: CPT

## 2023-05-08 PROCEDURE — 6370000000 HC RX 637 (ALT 250 FOR IP): Performed by: INTERNAL MEDICINE

## 2023-05-08 PROCEDURE — 6370000000 HC RX 637 (ALT 250 FOR IP): Performed by: HOSPITALIST

## 2023-05-08 PROCEDURE — 6360000002 HC RX W HCPCS: Performed by: HOSPITALIST

## 2023-05-08 PROCEDURE — 2060000000 HC ICU INTERMEDIATE R&B

## 2023-05-08 PROCEDURE — 82962 GLUCOSE BLOOD TEST: CPT

## 2023-05-08 PROCEDURE — 97530 THERAPEUTIC ACTIVITIES: CPT

## 2023-05-08 RX ADMIN — SODIUM CHLORIDE, PRESERVATIVE FREE 10 ML: 5 INJECTION INTRAVENOUS at 20:17

## 2023-05-08 RX ADMIN — ONDANSETRON 4 MG: 2 INJECTION INTRAMUSCULAR; INTRAVENOUS at 01:00

## 2023-05-08 RX ADMIN — ACETAMINOPHEN 650 MG: 325 TABLET ORAL at 11:58

## 2023-05-08 RX ADMIN — DILTIAZEM HYDROCHLORIDE 30 MG: 30 TABLET, FILM COATED ORAL at 06:31

## 2023-05-08 RX ADMIN — LEVOTHYROXINE SODIUM 50 MCG: 0.03 TABLET ORAL at 06:31

## 2023-05-08 RX ADMIN — APIXABAN 5 MG: 5 TABLET, FILM COATED ORAL at 20:32

## 2023-05-08 RX ADMIN — ATORVASTATIN CALCIUM 20 MG: 20 TABLET, FILM COATED ORAL at 08:21

## 2023-05-08 RX ADMIN — CHOLECALCIFEROL TAB 125 MCG (5000 UNIT) 5000 UNITS: 125 TAB at 08:22

## 2023-05-08 RX ADMIN — PANTOPRAZOLE SODIUM 40 MG: 40 TABLET, DELAYED RELEASE ORAL at 20:15

## 2023-05-08 RX ADMIN — METFORMIN HYDROCHLORIDE 500 MG: 500 TABLET ORAL at 08:21

## 2023-05-08 RX ADMIN — METOPROLOL TARTRATE 25 MG: 25 TABLET, FILM COATED ORAL at 20:15

## 2023-05-08 RX ADMIN — APIXABAN 5 MG: 5 TABLET, FILM COATED ORAL at 08:21

## 2023-05-08 RX ADMIN — SODIUM CHLORIDE, PRESERVATIVE FREE 10 ML: 5 INJECTION INTRAVENOUS at 08:26

## 2023-05-08 RX ADMIN — METOPROLOL TARTRATE 25 MG: 25 TABLET, FILM COATED ORAL at 08:21

## 2023-05-08 RX ADMIN — DILTIAZEM HYDROCHLORIDE 30 MG: 30 TABLET, FILM COATED ORAL at 13:40

## 2023-05-08 RX ADMIN — CYANOCOBALAMIN TAB 1000 MCG 500 MCG: 1000 TAB at 08:21

## 2023-05-08 ASSESSMENT — PAIN SCALES - GENERAL
PAINLEVEL_OUTOF10: 0
PAINLEVEL_OUTOF10: 3
PAINLEVEL_OUTOF10: 0

## 2023-05-08 ASSESSMENT — PAIN DESCRIPTION - ORIENTATION: ORIENTATION: POSTERIOR

## 2023-05-08 ASSESSMENT — PAIN DESCRIPTION - LOCATION: LOCATION: HEAD

## 2023-05-08 ASSESSMENT — PAIN DESCRIPTION - DESCRIPTORS: DESCRIPTORS: ACHING

## 2023-05-08 NOTE — PROGRESS NOTES
Hospitalist Progress Note    NAME: Katrin Miranda   :  1945   MRN:  [id number]     Subjective:   Daily Progress Note: 2023       Chief complaint:   23--patient seen and examined in ICU, chart was reviewed. Patient remains tachycardic on monitor with patient on Cardizem drip patient palpitations or shortness of breath is better. Spoke with daughter at bedside. 23-patient seen in ICU, heart rate is better controlled on several oral medications. BP is soft. Stopped Cardizem drip HR dropped to 48. Will ask PT OT to ambulate and watch heart rate. Patient denies any dizziness palpitations or shortness of breath at this time.     Current Facility-Administered Medications   Medication Dose Route Frequency    metoprolol tartrate (LOPRESSOR) tablet 25 mg  25 mg Oral BID    atorvastatin (LIPITOR) tablet 20 mg  20 mg Oral Daily    levothyroxine (SYNTHROID) tablet 50 mcg  50 mcg Oral QAM AC    metFORMIN (GLUCOPHAGE) tablet 500 mg  500 mg Oral Daily with breakfast    vitamin B-12 (CYANOCOBALAMIN) tablet 500 mcg  500 mcg Oral Daily    vitamin D3 (CHOLECALCIFEROL) tablet 5,000 Units  5,000 Units Oral Daily    dilTIAZem (CARDIZEM) tablet 30 mg  30 mg Oral 3 times per day    pantoprazole (PROTONIX) tablet 40 mg  40 mg Oral Nightly    apixaban (ELIQUIS) tablet 5 mg  5 mg Oral BID    sodium chloride flush 0.9 % injection 5-40 mL  5-40 mL IntraVENous 2 times per day    sodium chloride flush 0.9 % injection 5-40 mL  5-40 mL IntraVENous PRN    0.9 % sodium chloride infusion   IntraVENous PRN    ondansetron (ZOFRAN-ODT) disintegrating tablet 4 mg  4 mg Oral Q8H PRN    Or    ondansetron (ZOFRAN) injection 4 mg  4 mg IntraVENous Q6H PRN    polyethylene glycol (GLYCOLAX) packet 17 g  17 g Oral Daily PRN    acetaminophen (TYLENOL) tablet 650 mg  650 mg Oral Q6H PRN    Or    acetaminophen (TYLENOL) suppository 650 mg  650 mg Rectal Q6H PRN    glucose chewable tablet 16 g  4 tablet Oral PRN    dextrose bolus 10%

## 2023-05-08 NOTE — CARE COORDINATION
0820: Chart reviewed. Per notes; patient followed via cardiology. CM will continue to follow patient and recs of medical team.    PT/OT orders noted.     CM will continue to follow patient and recs of medical team.

## 2023-05-08 NOTE — CONSULTS
CARDIAC ELECTROPHYSIOLOGY CONSULTATION    PATIENT NAME: Jayne Shah  YOB: 1945  AGE: 65 y.o. GENDER: female      REASON FOR CONSULT: Atrial fibrillation     CHIEF COMPLAINT:  Palpitations    HISTORY OF PRESENT ILLNESS:  Jayne Shah is a 65 y.o.  female with past medical history significant for paroxysmal atrial fibrillation, who was evaluated today due to atrial fibrillation with RVR. Records from hospital admission course thus far reviewed. Patient had seen me on Friday in clinic where an AF ablation was discussed. Over the weekend presented with palpitations, with associated dyspnea, dizziness and chest discomfort to the hospital. She was started on IV diltiazem and weaned off once converted to sinus rhythm. This am she was bradycardic; hence metoprolol was reduced. EP consulted for consideration of AF ablation. PAST MEDICAL HISTORY:  Past Medical History:   Diagnosis Date    Atrial fibrillation (HCC)     Diabetes ([address]     GERD (gastroesophageal reflux disease)     High cholesterol     Hypertension     Hypokalemia     Thyroid disease        INPATIENT MEDICATIONS:  Home medications reviewed. Current Outpatient Medications   Medication Instructions    apixaban (ELIQUIS) 5 mg, Oral, 2 TIMES DAILY    atorvastatin (LIPITOR) 20 mg, Oral, DAILY, Daily before bed    Cholecalciferol (VITAMIN D3) 75 MCG (3000 UT) TABS Oral    cholestyramine light 5.5 g, Oral, DAILY PRN    levothyroxine (SYNTHROID) 50 mcg, Oral, DAILY BEFORE BREAKFAST    metFORMIN (GLUCOPHAGE) 500 mg, Oral    metoprolol tartrate (LOPRESSOR) 25 mg, Oral, DAILY    omeprazole (PRILOSEC) 40 mg, Oral, DAILY, Daily at bedtime    spironolactone (ALDACTONE) 25 mg, Oral, DAILY    vitamin B-12 (CYANOCOBALAMIN) 500 mcg, Oral, DAILY    vitamin D3 (CHOLECALCIFEROL) 5,000 Units, Oral, DAILY         ALLERGIES:  Allergies reviewed with the patient,No Known Allergies .       FAMILY HISTORY:    No known family history of sudden cardiac

## 2023-05-08 NOTE — PROGRESS NOTES
Patient complained of dizziness and nausea. Heart rate has been 50s to high 40s during the night and hypotensive. Last BP of 84/38. Administered PRN Zofran for nausea. Called on call hospital Dr. Pearla Runner and he instructed me to modify the metoprolol from 50 mg to 25 mg.

## 2023-05-09 ENCOUNTER — APPOINTMENT (OUTPATIENT)
Facility: HOSPITAL | Age: 78
DRG: 309 | End: 2023-05-09
Payer: MEDICARE

## 2023-05-09 LAB
ALBUMIN SERPL-MCNC: 3.2 G/DL (ref 3.5–5)
ANION GAP SERPL CALC-SCNC: 3 MMOL/L (ref 5–15)
BUN SERPL-MCNC: 13 MG/DL (ref 6–20)
BUN/CREAT SERPL: 19 (ref 12–20)
CA-I BLD-MCNC: 8.5 MG/DL (ref 8.5–10.1)
CHLORIDE SERPL-SCNC: 104 MMOL/L (ref 97–108)
CO2 SERPL-SCNC: 30 MMOL/L (ref 21–32)
CREAT SERPL-MCNC: 0.67 MG/DL (ref 0.55–1.02)
GLUCOSE BLD STRIP.AUTO-MCNC: 116 MG/DL (ref 65–100)
GLUCOSE BLD STRIP.AUTO-MCNC: 134 MG/DL (ref 65–100)
GLUCOSE BLD STRIP.AUTO-MCNC: 147 MG/DL (ref 65–100)
GLUCOSE BLD STRIP.AUTO-MCNC: 157 MG/DL (ref 65–100)
GLUCOSE SERPL-MCNC: 147 MG/DL (ref 65–100)
MAGNESIUM SERPL-MCNC: 1.5 MG/DL (ref 1.6–2.4)
PERFORMED BY:: ABNORMAL
PHOSPHATE SERPL-MCNC: 2.7 MG/DL (ref 2.6–4.7)
POTASSIUM SERPL-SCNC: 3.4 MMOL/L (ref 3.5–5.1)
SODIUM SERPL-SCNC: 137 MMOL/L (ref 136–145)
T4 FREE SERPL-MCNC: 1.3 NG/DL (ref 0.8–1.5)
TSH SERPL DL<=0.05 MIU/L-ACNC: 1.45 UIU/ML (ref 0.36–3.74)

## 2023-05-09 PROCEDURE — 6370000000 HC RX 637 (ALT 250 FOR IP): Performed by: STUDENT IN AN ORGANIZED HEALTH CARE EDUCATION/TRAINING PROGRAM

## 2023-05-09 PROCEDURE — 6360000002 HC RX W HCPCS: Performed by: INTERNAL MEDICINE

## 2023-05-09 PROCEDURE — 83735 ASSAY OF MAGNESIUM: CPT

## 2023-05-09 PROCEDURE — 80069 RENAL FUNCTION PANEL: CPT

## 2023-05-09 PROCEDURE — 6360000002 HC RX W HCPCS: Performed by: HOSPITALIST

## 2023-05-09 PROCEDURE — 71045 X-RAY EXAM CHEST 1 VIEW: CPT

## 2023-05-09 PROCEDURE — 36415 COLL VENOUS BLD VENIPUNCTURE: CPT

## 2023-05-09 PROCEDURE — 6370000000 HC RX 637 (ALT 250 FOR IP): Performed by: INTERNAL MEDICINE

## 2023-05-09 PROCEDURE — 6370000000 HC RX 637 (ALT 250 FOR IP): Performed by: HOSPITALIST

## 2023-05-09 PROCEDURE — 97165 OT EVAL LOW COMPLEX 30 MIN: CPT

## 2023-05-09 PROCEDURE — 6370000000 HC RX 637 (ALT 250 FOR IP): Performed by: NURSE PRACTITIONER

## 2023-05-09 PROCEDURE — 84443 ASSAY THYROID STIM HORMONE: CPT

## 2023-05-09 PROCEDURE — 2500000003 HC RX 250 WO HCPCS: Performed by: INTERNAL MEDICINE

## 2023-05-09 PROCEDURE — 2580000003 HC RX 258: Performed by: HOSPITALIST

## 2023-05-09 PROCEDURE — 2060000000 HC ICU INTERMEDIATE R&B

## 2023-05-09 PROCEDURE — 97530 THERAPEUTIC ACTIVITIES: CPT

## 2023-05-09 PROCEDURE — 2580000003 HC RX 258: Performed by: INTERNAL MEDICINE

## 2023-05-09 PROCEDURE — 84439 ASSAY OF FREE THYROXINE: CPT

## 2023-05-09 PROCEDURE — 82962 GLUCOSE BLOOD TEST: CPT

## 2023-05-09 RX ORDER — DILTIAZEM HYDROCHLORIDE 120 MG/1
120 CAPSULE, COATED, EXTENDED RELEASE ORAL DAILY
Status: DISCONTINUED | OUTPATIENT
Start: 2023-05-09 | End: 2023-05-10

## 2023-05-09 RX ORDER — MAGNESIUM SULFATE HEPTAHYDRATE 40 MG/ML
2000 INJECTION, SOLUTION INTRAVENOUS ONCE
Status: COMPLETED | OUTPATIENT
Start: 2023-05-09 | End: 2023-05-09

## 2023-05-09 RX ADMIN — CEFTRIAXONE SODIUM 1000 MG: 1 INJECTION, POWDER, FOR SOLUTION INTRAMUSCULAR; INTRAVENOUS at 20:10

## 2023-05-09 RX ADMIN — SODIUM CHLORIDE, PRESERVATIVE FREE 10 ML: 5 INJECTION INTRAVENOUS at 20:31

## 2023-05-09 RX ADMIN — ACETAMINOPHEN 650 MG: 325 TABLET ORAL at 02:52

## 2023-05-09 RX ADMIN — ONDANSETRON 4 MG: 2 INJECTION INTRAMUSCULAR; INTRAVENOUS at 00:40

## 2023-05-09 RX ADMIN — PANTOPRAZOLE SODIUM 40 MG: 40 TABLET, DELAYED RELEASE ORAL at 20:10

## 2023-05-09 RX ADMIN — APIXABAN 5 MG: 5 TABLET, FILM COATED ORAL at 08:49

## 2023-05-09 RX ADMIN — APIXABAN 5 MG: 5 TABLET, FILM COATED ORAL at 20:31

## 2023-05-09 RX ADMIN — METFORMIN HYDROCHLORIDE 500 MG: 500 TABLET ORAL at 08:49

## 2023-05-09 RX ADMIN — ACETAMINOPHEN 650 MG: 325 TABLET ORAL at 13:47

## 2023-05-09 RX ADMIN — DILTIAZEM HYDROCHLORIDE 120 MG: 120 CAPSULE, COATED, EXTENDED RELEASE ORAL at 13:42

## 2023-05-09 RX ADMIN — LEVOTHYROXINE SODIUM 50 MCG: 0.03 TABLET ORAL at 07:26

## 2023-05-09 RX ADMIN — DILTIAZEM HYDROCHLORIDE 30 MG: 30 TABLET, FILM COATED ORAL at 07:26

## 2023-05-09 RX ADMIN — ATORVASTATIN CALCIUM 20 MG: 20 TABLET, FILM COATED ORAL at 08:49

## 2023-05-09 RX ADMIN — MAGNESIUM SULFATE HEPTAHYDRATE 2000 MG: 40 INJECTION, SOLUTION INTRAVENOUS at 20:10

## 2023-05-09 RX ADMIN — CYANOCOBALAMIN TAB 1000 MCG 500 MCG: 1000 TAB at 08:49

## 2023-05-09 RX ADMIN — METOPROLOL TARTRATE 25 MG: 25 TABLET, FILM COATED ORAL at 08:49

## 2023-05-09 RX ADMIN — CHOLECALCIFEROL TAB 125 MCG (5000 UNIT) 5000 UNITS: 125 TAB at 08:49

## 2023-05-09 RX ADMIN — SODIUM CHLORIDE, PRESERVATIVE FREE 10 ML: 5 INJECTION INTRAVENOUS at 08:51

## 2023-05-09 ASSESSMENT — PAIN DESCRIPTION - DESCRIPTORS
DESCRIPTORS: ACHING
DESCRIPTORS: ACHING

## 2023-05-09 ASSESSMENT — PAIN SCALES - GENERAL
PAINLEVEL_OUTOF10: 0
PAINLEVEL_OUTOF10: 2
PAINLEVEL_OUTOF10: 3
PAINLEVEL_OUTOF10: 0

## 2023-05-09 ASSESSMENT — PAIN DESCRIPTION - LOCATION
LOCATION: HEAD
LOCATION: GENERALIZED;HEAD

## 2023-05-09 ASSESSMENT — PAIN DESCRIPTION - ORIENTATION: ORIENTATION: ANTERIOR

## 2023-05-09 NOTE — PROGRESS NOTES
Hospitalist Progress Note    NAME: Hilario Orozco   :  1945   MRN:  [id number]     Subjective:   Daily Progress Note: 2023       Chief complaint:   23--patient seen and examined in ICU, chart was reviewed. Patient remains tachycardic on monitor with patient on Cardizem drip patient palpitations or shortness of breath is better. Spoke with daughter at bedside. 23-patient seen in ICU, heart rate is better controlled on several oral medications. BP is soft. Stopped Cardizem drip HR dropped to 48. Will ask PT OT to ambulate and watch heart rate. Patient denies any dizziness palpitations or shortness of breath at this time. -----------------------------------------    : Sitting in chair. No CP, SOB.  Tele: [phone]    I discussed with EP/Dr. Bailey Maryanne: Cardizem; outpatient ablation     Current Facility-Administered Medications   Medication Dose Route Frequency    dilTIAZem (CARDIZEM CD) extended release capsule 120 mg  120 mg Oral Daily    metoprolol tartrate (LOPRESSOR) tablet 25 mg  25 mg Oral BID    dilTIAZem (CARDIZEM) tablet 30 mg  30 mg Oral Q8H PRN    atorvastatin (LIPITOR) tablet 20 mg  20 mg Oral Daily    levothyroxine (SYNTHROID) tablet 50 mcg  50 mcg Oral QAM AC    metFORMIN (GLUCOPHAGE) tablet 500 mg  500 mg Oral Daily with breakfast    vitamin B-12 (CYANOCOBALAMIN) tablet 500 mcg  500 mcg Oral Daily    vitamin D3 (CHOLECALCIFEROL) tablet 5,000 Units  5,000 Units Oral Daily    pantoprazole (PROTONIX) tablet 40 mg  40 mg Oral Nightly    apixaban (ELIQUIS) tablet 5 mg  5 mg Oral BID    sodium chloride flush 0.9 % injection 5-40 mL  5-40 mL IntraVENous 2 times per day    sodium chloride flush 0.9 % injection 5-40 mL  5-40 mL IntraVENous PRN    0.9 % sodium chloride infusion   IntraVENous PRN    ondansetron (ZOFRAN-ODT) disintegrating tablet 4 mg  4 mg Oral Q8H PRN    Or    ondansetron (ZOFRAN) injection 4 mg  4 mg IntraVENous Q6H PRN    polyethylene glycol (GLYCOLAX)

## 2023-05-09 NOTE — PROGRESS NOTES
Pt c/o lightheadedness and nausea. Believes it is due to her BP med being taken at night. RN gave Zofran IV. Vital signs stable.

## 2023-05-09 NOTE — PROGRESS NOTES
Dual skin assessment performed with Ray De RN. Pt has no pressure injuries. Bruising noted to BUE from IV attempts. Skin is dry and intact. CHG bath performed.

## 2023-05-09 NOTE — PROGRESS NOTES
Pt converted from SR back into Afib 140's. RN contacted on call cardiologist with [address], awaiting call back.

## 2023-05-09 NOTE — PROGRESS NOTES
Physician Progress Note      PATIENT:               Lisa Monte  CSN #:                  [id number]  :                       1945  ADMIT DATE:       2023 9:15 AM  DISCH DATE:  RESPONDING  PROVIDER #:        Bud Pardo MD          QUERY TEXT:    Patient admitted with palpitations, noted to have chronic atrial fibrillation   and is maintained on Eliquis. If possible, please document in progress notes   and discharge summary if you are evaluating and/or treating any of the   following: The medical record reflects the following:  Risk Factors: 66 yo female with HTN, DM, hypothyroidism  Clinical Indicators:  EKG: Atrial fibrillation with rapid ventricular response  Treatment: Eliquis, IV Cardizem    Thank you,  Martina Chatterjee RN, CCDS  Options provided:  -- Secondary hypercoagulable state in a patient with atrial fibrillation  -- Other - I will add my own diagnosis  -- Disagree - Not applicable / Not valid  -- Disagree - Clinically unable to determine / Unknown  -- Refer to Clinical Documentation Reviewer    PROVIDER RESPONSE TEXT:    This patient has secondary hypercoagulable state in a patient with atrial   fibrillation.     Query created by: Asia Keenan on 2023 1:33 PM      Electronically signed by:  Bud Pardo MD 2023 6:23 PM

## 2023-05-09 NOTE — PROGRESS NOTES
Patient's HR sustaining in 130s-150s after receiving morning metoprolol. Dr. Aristides Yen and Dr. Curtis Luevano notified.

## 2023-05-09 NOTE — PROGRESS NOTES
CARDIOLOGY PROGRESS NOTE    PATIENT NAME: Jeanine Oconnell  YOB: 1945  AGE: 65 y.o. GENDER: female      Jeanine Oconnell is a 65 y.o.  female with past medical history significant for paroxysmal atrial fibrillation, who was evaluated today due to atrial fibrillation with RVR. Records from hospital admission course thus far reviewed. Patient had seen EP on Friday in clinic where an AF ablation was discussed. Over the weekend presented with palpitations, with associated dyspnea, dizziness and chest discomfort to the hospital. She was started on IV diltiazem and weaned off once converted to sinus rhythm. This am she was bradycardic; hence metoprolol was reduced. EP consulted for consideration of AF ablation. PAST MEDICAL HISTORY:  Past Medical History:   Diagnosis Date    Atrial fibrillation (HCC)     Diabetes ([address]     GERD (gastroesophageal reflux disease)     High cholesterol     Hypertension     Hypokalemia     Thyroid disease        INPATIENT MEDICATIONS:  Home medications reviewed. Current Outpatient Medications   Medication Instructions    apixaban (ELIQUIS) 5 mg, Oral, 2 TIMES DAILY    atorvastatin (LIPITOR) 20 mg, Oral, DAILY, Daily before bed    Cholecalciferol (VITAMIN D3) 75 MCG (3000 UT) TABS Oral    cholestyramine light 5.5 g, Oral, DAILY PRN    levothyroxine (SYNTHROID) 50 mcg, Oral, DAILY BEFORE BREAKFAST    metFORMIN (GLUCOPHAGE) 500 mg, Oral    metoprolol tartrate (LOPRESSOR) 25 mg, Oral, DAILY    omeprazole (PRILOSEC) 40 mg, Oral, DAILY, Daily at bedtime    spironolactone (ALDACTONE) 25 mg, Oral, DAILY    vitamin B-12 (CYANOCOBALAMIN) 500 mcg, Oral, DAILY    vitamin D3 (CHOLECALCIFEROL) 5,000 Units, Oral, DAILY         ALLERGIES:  Allergies reviewed with the patient,No Known Allergies . FAMILY HISTORY:    No known family history of sudden cardiac death, syncope, arrhythmias, pacemakers, or ICDs.   Family History   Problem Relation Age of Onset    COPD Father

## 2023-05-10 LAB
ANION GAP SERPL CALC-SCNC: 4 MMOL/L (ref 5–15)
BASOPHILS # BLD: 0 K/UL (ref 0–0.1)
BASOPHILS NFR BLD: 0 % (ref 0–1)
BUN SERPL-MCNC: 11 MG/DL (ref 6–20)
BUN/CREAT SERPL: 20 (ref 12–20)
CA-I BLD-MCNC: 8.5 MG/DL (ref 8.5–10.1)
CHLORIDE SERPL-SCNC: 101 MMOL/L (ref 97–108)
CO2 SERPL-SCNC: 33 MMOL/L (ref 21–32)
CREAT SERPL-MCNC: 0.56 MG/DL (ref 0.55–1.02)
DIFFERENTIAL METHOD BLD: ABNORMAL
EOSINOPHIL # BLD: 0 K/UL (ref 0–0.4)
EOSINOPHIL NFR BLD: 1 % (ref 0–7)
ERYTHROCYTE [DISTWIDTH] IN BLOOD BY AUTOMATED COUNT: 14.1 % (ref 11.5–14.5)
GLUCOSE BLD STRIP.AUTO-MCNC: 117 MG/DL (ref 65–100)
GLUCOSE BLD STRIP.AUTO-MCNC: 129 MG/DL (ref 65–100)
GLUCOSE BLD STRIP.AUTO-MCNC: 147 MG/DL (ref 65–100)
GLUCOSE BLD STRIP.AUTO-MCNC: 92 MG/DL (ref 65–100)
GLUCOSE SERPL-MCNC: 125 MG/DL (ref 65–100)
HCT VFR BLD AUTO: 35 % (ref 35–47)
HGB BLD-MCNC: 11.1 G/DL (ref 11.5–16)
IMM GRANULOCYTES # BLD AUTO: 0 K/UL (ref 0–0.04)
IMM GRANULOCYTES NFR BLD AUTO: 0 % (ref 0–0.5)
LYMPHOCYTES # BLD: 0.6 K/UL (ref 0.8–3.5)
LYMPHOCYTES NFR BLD: 13 % (ref 12–49)
MCH RBC QN AUTO: 29 PG (ref 26–34)
MCHC RBC AUTO-ENTMCNC: 31.7 G/DL (ref 30–36.5)
MCV RBC AUTO: 91.4 FL (ref 80–99)
MONOCYTES # BLD: 0.3 K/UL (ref 0–1)
MONOCYTES NFR BLD: 7 % (ref 5–13)
NEUTS SEG # BLD: 3.4 K/UL (ref 1.8–8)
NEUTS SEG NFR BLD: 79 % (ref 32–75)
NRBC # BLD: 0 K/UL (ref 0–0.01)
NRBC BLD-RTO: 0 PER 100 WBC
PERFORMED BY:: ABNORMAL
PERFORMED BY:: NORMAL
PLATELET # BLD AUTO: 164 K/UL (ref 150–400)
PMV BLD AUTO: 9.4 FL (ref 8.9–12.9)
POTASSIUM SERPL-SCNC: 3.2 MMOL/L (ref 3.5–5.1)
PROCALCITONIN SERPL-MCNC: <0.05 NG/ML
RBC # BLD AUTO: 3.83 M/UL (ref 3.8–5.2)
SODIUM SERPL-SCNC: 138 MMOL/L (ref 136–145)
WBC # BLD AUTO: 4.3 K/UL (ref 3.6–11)

## 2023-05-10 PROCEDURE — 6370000000 HC RX 637 (ALT 250 FOR IP): Performed by: INTERNAL MEDICINE

## 2023-05-10 PROCEDURE — 36415 COLL VENOUS BLD VENIPUNCTURE: CPT

## 2023-05-10 PROCEDURE — 6370000000 HC RX 637 (ALT 250 FOR IP): Performed by: NURSE PRACTITIONER

## 2023-05-10 PROCEDURE — 2580000003 HC RX 258: Performed by: HOSPITALIST

## 2023-05-10 PROCEDURE — 82962 GLUCOSE BLOOD TEST: CPT

## 2023-05-10 PROCEDURE — 2580000003 HC RX 258: Performed by: INTERNAL MEDICINE

## 2023-05-10 PROCEDURE — 6360000002 HC RX W HCPCS: Performed by: INTERNAL MEDICINE

## 2023-05-10 PROCEDURE — 94761 N-INVAS EAR/PLS OXIMETRY MLT: CPT

## 2023-05-10 PROCEDURE — 84145 PROCALCITONIN (PCT): CPT

## 2023-05-10 PROCEDURE — 2060000000 HC ICU INTERMEDIATE R&B

## 2023-05-10 PROCEDURE — 2700000000 HC OXYGEN THERAPY PER DAY

## 2023-05-10 PROCEDURE — 6370000000 HC RX 637 (ALT 250 FOR IP): Performed by: STUDENT IN AN ORGANIZED HEALTH CARE EDUCATION/TRAINING PROGRAM

## 2023-05-10 PROCEDURE — 80048 BASIC METABOLIC PNL TOTAL CA: CPT

## 2023-05-10 PROCEDURE — 6370000000 HC RX 637 (ALT 250 FOR IP): Performed by: HOSPITALIST

## 2023-05-10 PROCEDURE — 85025 COMPLETE CBC W/AUTO DIFF WBC: CPT

## 2023-05-10 RX ORDER — AMIODARONE HYDROCHLORIDE 200 MG/1
400 TABLET ORAL 2 TIMES DAILY
Status: DISCONTINUED | OUTPATIENT
Start: 2023-05-10 | End: 2023-05-11 | Stop reason: HOSPADM

## 2023-05-10 RX ORDER — POTASSIUM CHLORIDE 20 MEQ/1
40 TABLET, EXTENDED RELEASE ORAL ONCE
Status: COMPLETED | OUTPATIENT
Start: 2023-05-10 | End: 2023-05-10

## 2023-05-10 RX ADMIN — CYANOCOBALAMIN TAB 1000 MCG 500 MCG: 1000 TAB at 09:13

## 2023-05-10 RX ADMIN — CEFTRIAXONE SODIUM 1000 MG: 1 INJECTION, POWDER, FOR SOLUTION INTRAMUSCULAR; INTRAVENOUS at 20:06

## 2023-05-10 RX ADMIN — SODIUM CHLORIDE, PRESERVATIVE FREE 10 ML: 5 INJECTION INTRAVENOUS at 09:20

## 2023-05-10 RX ADMIN — ATORVASTATIN CALCIUM 20 MG: 20 TABLET, FILM COATED ORAL at 09:14

## 2023-05-10 RX ADMIN — DILTIAZEM HYDROCHLORIDE 120 MG: 120 CAPSULE, COATED, EXTENDED RELEASE ORAL at 09:13

## 2023-05-10 RX ADMIN — POTASSIUM CHLORIDE 40 MEQ: 1500 TABLET, EXTENDED RELEASE ORAL at 21:33

## 2023-05-10 RX ADMIN — AMIODARONE HYDROCHLORIDE 400 MG: 200 TABLET ORAL at 10:45

## 2023-05-10 RX ADMIN — METOPROLOL TARTRATE 25 MG: 25 TABLET, FILM COATED ORAL at 09:13

## 2023-05-10 RX ADMIN — METOPROLOL TARTRATE 25 MG: 25 TABLET, FILM COATED ORAL at 20:04

## 2023-05-10 RX ADMIN — LEVOTHYROXINE SODIUM 50 MCG: 0.03 TABLET ORAL at 06:29

## 2023-05-10 RX ADMIN — APIXABAN 5 MG: 5 TABLET, FILM COATED ORAL at 09:13

## 2023-05-10 RX ADMIN — AMIODARONE HYDROCHLORIDE 400 MG: 200 TABLET ORAL at 20:04

## 2023-05-10 RX ADMIN — SODIUM CHLORIDE, PRESERVATIVE FREE 10 ML: 5 INJECTION INTRAVENOUS at 20:10

## 2023-05-10 RX ADMIN — METFORMIN HYDROCHLORIDE 500 MG: 500 TABLET ORAL at 09:13

## 2023-05-10 RX ADMIN — APIXABAN 5 MG: 5 TABLET, FILM COATED ORAL at 21:00

## 2023-05-10 RX ADMIN — CHOLECALCIFEROL TAB 125 MCG (5000 UNIT) 5000 UNITS: 125 TAB at 09:14

## 2023-05-10 RX ADMIN — PANTOPRAZOLE SODIUM 40 MG: 40 TABLET, DELAYED RELEASE ORAL at 20:04

## 2023-05-10 ASSESSMENT — PAIN SCALES - GENERAL
PAINLEVEL_OUTOF10: 0

## 2023-05-10 NOTE — PROGRESS NOTES
CARDIOLOGY PROGRESS NOTE    PATIENT NAME: Ana Joya  YOB: 1945  AGE: 65 y.o. GENDER: female      Ana Joya is a 65 y.o.  female with past medical history significant for paroxysmal atrial fibrillation, who was evaluated today due to atrial fibrillation with RVR. Records from hospital admission course thus far reviewed. Patient had seen EP on Friday in clinic where an AF ablation was discussed. Over the weekend presented with palpitations, with associated dyspnea, dizziness and chest discomfort to the hospital. She was started on IV diltiazem and weaned off once converted to sinus rhythm. This am she was bradycardic; hence metoprolol was reduced. EP consulted for consideration of AF ablation. Patient seen and examined. She is sitting up in bed, no acute distress. We discussed in depth the plan and changes to medications. Reports palpitations with exertion. She denies chest pain, shortness of breath dizziness or edema. PAST MEDICAL HISTORY:  Past Medical History:   Diagnosis Date    Atrial fibrillation (HCC)     Diabetes ([address]     GERD (gastroesophageal reflux disease)     High cholesterol     Hypertension     Hypokalemia     Thyroid disease      INPATIENT MEDICATIONS:  Home medications reviewed.     Current Outpatient Medications   Medication Instructions    apixaban (ELIQUIS) 5 mg, Oral, 2 TIMES DAILY    atorvastatin (LIPITOR) 20 mg, Oral, DAILY, Daily before bed    Cholecalciferol (VITAMIN D3) 75 MCG (3000 UT) TABS Oral    cholestyramine light 5.5 g, Oral, DAILY PRN    levothyroxine (SYNTHROID) 50 mcg, Oral, DAILY BEFORE BREAKFAST    metFORMIN (GLUCOPHAGE) 500 mg, Oral    metoprolol tartrate (LOPRESSOR) 25 mg, Oral, DAILY    omeprazole (PRILOSEC) 40 mg, Oral, DAILY, Daily at bedtime    spironolactone (ALDACTONE) 25 mg, Oral, DAILY    vitamin B-12 (CYANOCOBALAMIN) 500 mcg, Oral, DAILY    vitamin D3 (CHOLECALCIFEROL) 5,000 Units, Oral, DAILY         ALLERGIES:  Allergies

## 2023-05-10 NOTE — PROGRESS NOTES
Dual skin assessment performed with Alejandrina Iniguez RN. Pt has multiple bruises to both arms. No pressure injuries. Skin is dry and intact.

## 2023-05-10 NOTE — CARE COORDINATION
CM reviewed chart. Patient's ablation is to be done outpatient. Discharge disposition is home self care. Discharge is pending medical clearance; patient continues to receive IV antibiotics.

## 2023-05-11 VITALS
SYSTOLIC BLOOD PRESSURE: 121 MMHG | BODY MASS INDEX: 36.8 KG/M2 | TEMPERATURE: 97.9 F | RESPIRATION RATE: 18 BRPM | HEART RATE: 59 BPM | DIASTOLIC BLOOD PRESSURE: 63 MMHG | OXYGEN SATURATION: 92 % | WEIGHT: 207.67 LBS | HEIGHT: 63 IN

## 2023-05-11 LAB
ANION GAP SERPL CALC-SCNC: 6 MMOL/L (ref 5–15)
BUN SERPL-MCNC: 13 MG/DL (ref 6–20)
BUN/CREAT SERPL: 20 (ref 12–20)
CA-I BLD-MCNC: 8.7 MG/DL (ref 8.5–10.1)
CHLORIDE SERPL-SCNC: 102 MMOL/L (ref 97–108)
CO2 SERPL-SCNC: 30 MMOL/L (ref 21–32)
CREAT SERPL-MCNC: 0.65 MG/DL (ref 0.55–1.02)
GLUCOSE BLD STRIP.AUTO-MCNC: 141 MG/DL (ref 65–100)
GLUCOSE BLD STRIP.AUTO-MCNC: 149 MG/DL (ref 65–100)
GLUCOSE SERPL-MCNC: 159 MG/DL (ref 65–100)
PERFORMED BY:: ABNORMAL
PERFORMED BY:: ABNORMAL
POTASSIUM SERPL-SCNC: 3.9 MMOL/L (ref 3.5–5.1)
SODIUM SERPL-SCNC: 138 MMOL/L (ref 136–145)

## 2023-05-11 PROCEDURE — 36415 COLL VENOUS BLD VENIPUNCTURE: CPT

## 2023-05-11 PROCEDURE — 82962 GLUCOSE BLOOD TEST: CPT

## 2023-05-11 PROCEDURE — 80048 BASIC METABOLIC PNL TOTAL CA: CPT

## 2023-05-11 PROCEDURE — 2580000003 HC RX 258: Performed by: HOSPITALIST

## 2023-05-11 PROCEDURE — 6370000000 HC RX 637 (ALT 250 FOR IP): Performed by: INTERNAL MEDICINE

## 2023-05-11 PROCEDURE — 6370000000 HC RX 637 (ALT 250 FOR IP): Performed by: STUDENT IN AN ORGANIZED HEALTH CARE EDUCATION/TRAINING PROGRAM

## 2023-05-11 PROCEDURE — 6370000000 HC RX 637 (ALT 250 FOR IP): Performed by: NURSE PRACTITIONER

## 2023-05-11 PROCEDURE — 6370000000 HC RX 637 (ALT 250 FOR IP): Performed by: HOSPITALIST

## 2023-05-11 RX ORDER — AMIODARONE HYDROCHLORIDE 200 MG/1
200 TABLET ORAL 2 TIMES DAILY
Qty: 60 TABLET | Refills: 0 | Status: ON HOLD | OUTPATIENT
Start: 2023-05-11

## 2023-05-11 RX ADMIN — APIXABAN 5 MG: 5 TABLET, FILM COATED ORAL at 08:16

## 2023-05-11 RX ADMIN — LEVOTHYROXINE SODIUM 50 MCG: 0.03 TABLET ORAL at 08:16

## 2023-05-11 RX ADMIN — METOPROLOL TARTRATE 25 MG: 25 TABLET, FILM COATED ORAL at 08:16

## 2023-05-11 RX ADMIN — AMIODARONE HYDROCHLORIDE 400 MG: 200 TABLET ORAL at 08:16

## 2023-05-11 RX ADMIN — CHOLECALCIFEROL TAB 125 MCG (5000 UNIT) 5000 UNITS: 125 TAB at 08:16

## 2023-05-11 RX ADMIN — METFORMIN HYDROCHLORIDE 500 MG: 500 TABLET ORAL at 08:16

## 2023-05-11 RX ADMIN — SODIUM CHLORIDE, PRESERVATIVE FREE 10 ML: 5 INJECTION INTRAVENOUS at 08:20

## 2023-05-11 RX ADMIN — ATORVASTATIN CALCIUM 20 MG: 20 TABLET, FILM COATED ORAL at 08:16

## 2023-05-11 RX ADMIN — CYANOCOBALAMIN TAB 1000 MCG 500 MCG: 1000 TAB at 08:16

## 2023-05-11 ASSESSMENT — PAIN SCALES - GENERAL
PAINLEVEL_OUTOF10: 0

## 2023-05-11 NOTE — PLAN OF CARE
OCCUPATIONAL THERAPY EVALUATION AND DISCHARGE  Patient: David Gallagher (9 y.o. female)  Date: 5/9/2023  Primary Diagnosis: Atrial fibrillation with rapid ventricular response ([address] [I48.91]       Precautions: Fall Risk                In place during session:NA    ASSESSMENT  Pt is a 65 y.o. female presenting to [facility] with c/o SOB, chest pain, admitted 5/6/23 and currently being treated for afib with rapid ventricular response. Pt received semi-supine in bed upon arrival, AXO x4, and agreeable to OT evaluation. Pt Mod I for supine:sit, sit:stand. Ambulated to bathroom to complete grooming standing at sink and toileting with mod I. Mod I with no AD for chair transfer. All needs within reach. Based on the objective data described below pt currently present at baseline  for ADLs and function mobility/transfers at this time. (See below for objective details and assist levels). Overall pt tolerated session well today with. Pt has no skilled acute OT needs at this time either noted by OT or reported by pt, will DC skilled OT following evaluation; pt verbalized understanding and agreement. Current OT DC recommendation Home with Family Care once medically appropriate. Current Level of Function Impacting Discharge (mobility/balance):Pt's HR ranging from 115s-130s during functional transfer. Other factors to consider for discharge: NA     PLAN :  Recommendations and Planned Interventions: DC from skilled OT services following evaluation.      Recommend for staff: Out of bed to chair for meals and Encourage HEP in prep for ADLs/mobility    Rationale for discharge:  Patient currently at functional baseline for transfers/mobility, no further skilled therapy required at this time    Frequency/Duration: Patient will be followed by physical therapy: DC from OT services following evaluation due to Patient currently at functional baseline for transfers/mobility, no further skilled therapy required at this time; no
PHYSICAL THERAPY EVALUATION  Patient: Lonny Dubin (74 y.o. female)  Date: 5/8/2023  Primary Diagnosis: Atrial fibrillation with rapid ventricular response ([facility] [address] [I48.91]       Precautions: Fall Risk                In place during session: EKG/telemetry  and Pulse ox    GOALS:    Problem: Physical Therapy - Adult  Goal: By Discharge: Performs mobility at highest level of function for planned discharge setting. See evaluation for individualized goals. Description: FUNCTIONAL STATUS PRIOR TO ADMISSION: Patient was independent and active without use of DME.    HOME SUPPORT PRIOR TO ADMISSION: The patient lived with multiple family members but did not require assistance. Physical Therapy Goals  Initiated 5/8/2023  Pt stated goal: To go home  Pt will be I with LE HEP in 7 days. Pt will perform bed mobility with Modified [place] in 7 days. Pt will perform transfers with Modified [place] in 7 days. Pt will amb 80 feet with LRAD safely with Modified [place] in 7 days. Pt will ascend/descend 5 steps with B handrail(s) and Stand by Assist in 7 days to safely enter/navigate home. Pt will demonstrate improvement in dynamic standing balance from fair to good in 7 days. Outcome: [facility] Resolved Not Met       ASSESSMENT  Pt is a 65 y.o. female admitted on 5/6/2023 for chest pain and SOB; pt currently being treated for a fib with RVR . Pt supine in bed upon PT arrival, agreeable to evaluation. Pt A&O x 4. Very pleasant and agreeable throughout. Based on the objective data described below, the patient currently presents with impaired functional mobility, decreased activity tolerance, impaired balance. (See below for objective details and assist levels). Overall pt tolerated session well today with mild fatigue. Pt will benefit from continued skilled PT to address above deficits and return to PLOF. Current PT DC recommendation Home with Family Care once medically appropriate.     Current Level
Problem: Discharge Planning  Goal: Discharge to home or other facility with appropriate resources  Outcome: [facility] Resolved Met  Flowsheets (Taken 5/11/2023 0810)  Discharge to home or other facility with appropriate resources: Identify discharge learning needs (meds, wound care, etc)     Problem: ABCDS Injury Assessment  Goal: Absence of physical injury  Outcome: [facility] Resolved Met     Problem: Safety - Adult  Goal: Free from fall injury  Outcome: [facility] Resolved Met     Problem: Pain  Goal: Verbalizes/displays adequate comfort level or baseline comfort level  Outcome: HH/[facility] Resolved Met     Problem: Chronic Conditions and Co-morbidities  Goal: Patient's chronic conditions and co-morbidity symptoms are monitored and maintained or improved  Outcome: [facility] Resolved Met  Flowsheets (Taken 5/11/2023 0810)  Care Plan - Patient's Chronic Conditions and Co-Morbidity Symptoms are Monitored and Maintained or Improved: Monitor and assess patient's chronic conditions and comorbid symptoms for stability, deterioration, or improvement
Problem: Physical Therapy - Adult  Goal: By Discharge: Performs mobility at highest level of function for planned discharge setting. See evaluation for individualized goals. Description: FUNCTIONAL STATUS PRIOR TO ADMISSION: Patient was independent and active without use of DME.    HOME SUPPORT PRIOR TO ADMISSION: The patient lived with multiple family members but did not require assistance. Physical Therapy Goals  Initiated 5/8/2023  Pt stated goal: To go home  Pt will be I with LE HEP in 7 days. Pt will perform bed mobility with Modified [place] in 7 days. Pt will perform transfers with Modified [place] in 7 days. Pt will amb 80 feet with LRAD safely with Modified [place] in 7 days. Pt will ascend/descend 5 steps with B handrail(s) and Stand by Assist in 7 days to safely enter/navigate home. Pt will demonstrate improvement in dynamic standing balance from fair to good in 7 days.     5/8/2023 1533 by Fatmata Cortez, PT  Outcome: [facility] Resolved Not Met
Problem: Safety - Adult  Goal: Free from fall injury  5/9/2023 1940 by Chris Lo RN  Outcome: Progressing    Problem: Pain  Goal: Verbalizes/displays adequate comfort level or baseline comfort level  5/9/2023 1940 by Chris Lo RN  Outcome: Progressing     Problem: Chronic Conditions and Co-morbidities  Goal: Patient's chronic conditions and co-morbidity symptoms are monitored and maintained or improved  Outcome: Progressing
Caregiver / family present, and Side rails x3 and nsg updated. COMMUNICATION/EDUCATION:   The patients plan of care was discussed with: Occupational therapist and Registered nurse    Patient Education  Education Given To: Patient  Education Provided: Role of Therapy;Plan of Care;Home Exercise Program;Precautions;Transfer Training;Energy Conservation; Fall Prevention Strategies; Equipment  Education Method: Verbal  Barriers to Learning: None  Education Outcome: Verbalized understanding        Thank you for this referral.  Rosemary Soria, PT

## 2023-05-11 NOTE — DISCHARGE SUMMARY
Hospitalist Discharge Summary     Patient ID:    Reynold Licona  [id number]  6 y.o.  1945    Admit date: 5/6/2023    Discharge date : 5/11/2023      Final Diagnoses & Acute Mgmt:    A-fib with RVR & tachy-dudley phenomenon 48 - 140 ==> S/p Cardizem gtt and ICU mgmt. Metoprolol changed to bid. Patient was for outpatient EP ablation. Placed on Amio 400mg bid by Card for control ==> 3.15 sec pause that converted to Sinus Rhythm with HR of 60. Patient to continue Amio and Eliquis and follow-up with Cardiology    DMT2-diet controlled    HTN-soft BP    Hypothyroidism- chr stable    Bilat Infiltrate; Atypical PNA?; Procal <0.05. Treated empirically with Rocephin. No fever, cough, sob. Hypokalemia - replaced      Reason for Hospitalization:   Ms. Silver Sanford is a 65 y.o.  F known history of chronic A-fib on anticoagulation was being considered for ablation. Patient this morning woke up with palpitations, chest pain and shortness of breath. She came to the emergency room. Heart rate was 130s. She was seen by cardiology given IV Lopressor. She was started on oral beta-blockers. When I saw the patient she was still tachycardic heart rate in 130s. She has intermittent dyspnea on exertion. She denies any nausea, vomiting, headache, blurred vision, tingling, rectal bleed, cough, fever, chills. Discharge Medications       Medication List        START taking these medications      amiodarone 200 MG tablet  Commonly known as: CORDARONE  Take 1 tablet by mouth 2 times daily            CHANGE how you take these medications      metoprolol tartrate 25 MG tablet  Commonly known as: LOPRESSOR  Take 1 tablet by mouth 2 times daily  What changed: when to take this     vitamin D3 125 MCG (5000 UT) Tabs tablet  Commonly known as: CHOLECALCIFEROL  What changed: Another medication with the same name was removed. Continue taking this medication, and follow the directions you see here.             CONTINUE

## 2023-05-11 NOTE — PROGRESS NOTES
Hospitalist Progress Note    NAME: David Gallagher   :  1945   MRN:  [id number]     Subjective:   Daily Progress Note: 5/10/2023       Chief complaint:   23--patient seen and examined in ICU, chart was reviewed. Patient remains tachycardic on monitor with patient on Cardizem drip patient palpitations or shortness of breath is better. Spoke with daughter at bedside. 23-patient seen in ICU, heart rate is better controlled on several oral medications. BP is soft. Stopped Cardizem drip HR dropped to 48. Will ask PT OT to ambulate and watch heart rate. Patient denies any dizziness palpitations or shortness of breath at this time. -----------------------------------------    : Sitting in chair. No CP, SOB. Tele: -140    I discussed with EP/Dr. Jazzy Marcial: Cardizem; outpatient ablation   -----------------------------    5/10: Asymptomatic. Still RVR. Started on Amio PO by Cards.     Current Facility-Administered Medications   Medication Dose Route Frequency    amiodarone (CORDARONE) tablet 400 mg  400 mg Oral BID    cefTRIAXone (ROCEPHIN) 1,000 mg in sterile water 10 mL IV syringe  1,000 mg IntraVENous Q24H    metoprolol tartrate (LOPRESSOR) tablet 25 mg  25 mg Oral BID    dilTIAZem (CARDIZEM) tablet 30 mg  30 mg Oral Q8H PRN    atorvastatin (LIPITOR) tablet 20 mg  20 mg Oral Daily    levothyroxine (SYNTHROID) tablet 50 mcg  50 mcg Oral QAM AC    metFORMIN (GLUCOPHAGE) tablet 500 mg  500 mg Oral Daily with breakfast    vitamin B-12 (CYANOCOBALAMIN) tablet 500 mcg  500 mcg Oral Daily    vitamin D3 (CHOLECALCIFEROL) tablet 5,000 Units  5,000 Units Oral Daily    pantoprazole (PROTONIX) tablet 40 mg  40 mg Oral Nightly    apixaban (ELIQUIS) tablet 5 mg  5 mg Oral BID    sodium chloride flush 0.9 % injection 5-40 mL  5-40 mL IntraVENous 2 times per day    sodium chloride flush 0.9 % injection 5-40 mL  5-40 mL IntraVENous PRN    0.9 % sodium chloride infusion   IntraVENous PRN    ondansetron

## 2023-05-15 ENCOUNTER — HOSPITAL ENCOUNTER (OUTPATIENT)
Facility: HOSPITAL | Age: 78
Discharge: HOME | End: 2023-05-15
Attending: INTERNAL MEDICINE | Admitting: INTERNAL MEDICINE
Payer: MEDICARE

## 2023-05-15 ENCOUNTER — APPOINTMENT (OUTPATIENT)
Facility: HOSPITAL | Age: 78
End: 2023-05-15
Attending: INTERNAL MEDICINE
Payer: MEDICARE

## 2023-05-15 ENCOUNTER — ANESTHESIA EVENT (OUTPATIENT)
Facility: HOSPITAL | Age: 78
End: 2023-05-15
Payer: MEDICARE

## 2023-05-15 ENCOUNTER — ANESTHESIA (OUTPATIENT)
Facility: HOSPITAL | Age: 78
End: 2023-05-15
Payer: MEDICARE

## 2023-05-15 VITALS
HEIGHT: 63 IN | BODY MASS INDEX: 36.68 KG/M2 | RESPIRATION RATE: 18 BRPM | SYSTOLIC BLOOD PRESSURE: 142 MMHG | WEIGHT: 207 LBS | DIASTOLIC BLOOD PRESSURE: 72 MMHG | OXYGEN SATURATION: 94 % | HEART RATE: 79 BPM | TEMPERATURE: 98.2 F

## 2023-05-15 DIAGNOSIS — I48.91 ATRIAL FIBRILLATION (HCC): Primary | ICD-10-CM

## 2023-05-15 DIAGNOSIS — I48.0 PAROXYSMAL A-FIB (HCC): ICD-10-CM

## 2023-05-15 LAB
ABO + RH BLD: NORMAL
ACT BLD: 353 SEC (ref 74–125)
ACT BLD: 420 SEC (ref 74–125)
APTT PPP: 36.5 SEC (ref 21.2–34.1)
BLOOD GROUP ANTIBODIES SERPL: NEGATIVE
CA-I BLD-MCNC: 0.97 MMOL/L (ref 1.12–1.32)
CHLORIDE BLD-SCNC: 103 MMOL/L (ref 98–107)
CREAT UR-MCNC: 0.5 MG/DL (ref 0.6–1.3)
EKG ATRIAL RATE: 63 BPM
EKG DIAGNOSIS: NORMAL
EKG P AXIS: 102 DEGREES
EKG P-R INTERVAL: 140 MS
EKG Q-T INTERVAL: 466 MS
EKG QRS DURATION: 72 MS
EKG QTC CALCULATION (BAZETT): 476 MS
EKG R AXIS: 9 DEGREES
EKG T AXIS: 34 DEGREES
EKG VENTRICULAR RATE: 63 BPM
GLUCOSE BLD STRIP.AUTO-MCNC: 126 MG/DL (ref 65–100)
GLUCOSE BLD STRIP.AUTO-MCNC: 134 MG/DL (ref 65–100)
INR PPP: 1.7 (ref 0.9–1.1)
PERFORMED BY:: ABNORMAL
POTASSIUM BLD-SCNC: 3.5 MMOL/L (ref 3.5–5.5)
PROTHROMBIN TIME: 19.9 SEC (ref 11.9–14.6)
SODIUM BLD-SCNC: 142 MMOL/L (ref 136–145)
SPECIMEN EXP DATE BLD: NORMAL
THERAPEUTIC RANGE: ABNORMAL SEC (ref 82–109)

## 2023-05-15 PROCEDURE — 3700000000 HC ANESTHESIA ATTENDED CARE: Performed by: INTERNAL MEDICINE

## 2023-05-15 PROCEDURE — 2709999900 HC NON-CHARGEABLE SUPPLY: Performed by: INTERNAL MEDICINE

## 2023-05-15 PROCEDURE — 85610 PROTHROMBIN TIME: CPT

## 2023-05-15 PROCEDURE — 86900 BLOOD TYPING SEROLOGIC ABO: CPT

## 2023-05-15 PROCEDURE — 7100000001 HC PACU RECOVERY - ADDTL 15 MIN: Performed by: INTERNAL MEDICINE

## 2023-05-15 PROCEDURE — C1730 CATH, EP, 19 OR FEW ELECT: HCPCS | Performed by: INTERNAL MEDICINE

## 2023-05-15 PROCEDURE — 2720000010 HC SURG SUPPLY STERILE: Performed by: INTERNAL MEDICINE

## 2023-05-15 PROCEDURE — 86901 BLOOD TYPING SEROLOGIC RH(D): CPT

## 2023-05-15 PROCEDURE — 7100000011 HC PHASE II RECOVERY - ADDTL 15 MIN: Performed by: INTERNAL MEDICINE

## 2023-05-15 PROCEDURE — C1759 CATH, INTRA ECHOCARDIOGRAPHY: HCPCS | Performed by: INTERNAL MEDICINE

## 2023-05-15 PROCEDURE — 2580000003 HC RX 258: Performed by: NURSE ANESTHETIST, CERTIFIED REGISTERED

## 2023-05-15 PROCEDURE — 6360000002 HC RX W HCPCS: Performed by: NURSE ANESTHETIST, CERTIFIED REGISTERED

## 2023-05-15 PROCEDURE — 93005 ELECTROCARDIOGRAM TRACING: CPT | Performed by: INTERNAL MEDICINE

## 2023-05-15 PROCEDURE — 2500000003 HC RX 250 WO HCPCS: Performed by: NURSE ANESTHETIST, CERTIFIED REGISTERED

## 2023-05-15 PROCEDURE — 76937 US GUIDE VASCULAR ACCESS: CPT | Performed by: INTERNAL MEDICINE

## 2023-05-15 PROCEDURE — 82962 GLUCOSE BLOOD TEST: CPT

## 2023-05-15 PROCEDURE — C1766 INTRO/SHEATH,STRBLE,NON-PEEL: HCPCS | Performed by: INTERNAL MEDICINE

## 2023-05-15 PROCEDURE — 93312 ECHO TRANSESOPHAGEAL: CPT

## 2023-05-15 PROCEDURE — 3700000001 HC ADD 15 MINUTES (ANESTHESIA): Performed by: INTERNAL MEDICINE

## 2023-05-15 PROCEDURE — C1893 INTRO/SHEATH, FIXED,NON-PEEL: HCPCS | Performed by: INTERNAL MEDICINE

## 2023-05-15 PROCEDURE — 80047 BASIC METABLC PNL IONIZED CA: CPT

## 2023-05-15 PROCEDURE — 7100000000 HC PACU RECOVERY - FIRST 15 MIN: Performed by: INTERNAL MEDICINE

## 2023-05-15 PROCEDURE — 93656 COMPRE EP EVAL ABLTJ ATR FIB: CPT | Performed by: INTERNAL MEDICINE

## 2023-05-15 PROCEDURE — 85730 THROMBOPLASTIN TIME PARTIAL: CPT

## 2023-05-15 PROCEDURE — 2580000003 HC RX 258: Performed by: INTERNAL MEDICINE

## 2023-05-15 PROCEDURE — 86850 RBC ANTIBODY SCREEN: CPT

## 2023-05-15 PROCEDURE — 7100000010 HC PHASE II RECOVERY - FIRST 15 MIN: Performed by: INTERNAL MEDICINE

## 2023-05-15 PROCEDURE — 93623 PRGRMD STIMJ&PACG IV RX NFS: CPT | Performed by: INTERNAL MEDICINE

## 2023-05-15 PROCEDURE — 85347 COAGULATION TIME ACTIVATED: CPT

## 2023-05-15 PROCEDURE — C1732 CATH, EP, DIAG/ABL, 3D/VECT: HCPCS | Performed by: INTERNAL MEDICINE

## 2023-05-15 PROCEDURE — 93657 TX L/R ATRIAL FIB ADDL: CPT | Performed by: INTERNAL MEDICINE

## 2023-05-15 PROCEDURE — 6360000002 HC RX W HCPCS: Performed by: INTERNAL MEDICINE

## 2023-05-15 PROCEDURE — C1894 INTRO/SHEATH, NON-LASER: HCPCS | Performed by: INTERNAL MEDICINE

## 2023-05-15 PROCEDURE — C1769 GUIDE WIRE: HCPCS | Performed by: INTERNAL MEDICINE

## 2023-05-15 PROCEDURE — C1760 CLOSURE DEV, VASC: HCPCS | Performed by: INTERNAL MEDICINE

## 2023-05-15 RX ORDER — HEPARIN SODIUM 10000 [USP'U]/100ML
INJECTION, SOLUTION INTRAVENOUS CONTINUOUS PRN
Status: DISCONTINUED | OUTPATIENT
Start: 2023-05-15 | End: 2023-05-15 | Stop reason: SDUPTHER

## 2023-05-15 RX ORDER — FENTANYL CITRATE 50 UG/ML
INJECTION, SOLUTION INTRAMUSCULAR; INTRAVENOUS PRN
Status: DISCONTINUED | OUTPATIENT
Start: 2023-05-15 | End: 2023-05-15 | Stop reason: SDUPTHER

## 2023-05-15 RX ORDER — SODIUM CHLORIDE 0.9 % (FLUSH) 0.9 %
5-40 SYRINGE (ML) INJECTION EVERY 12 HOURS SCHEDULED
Status: DISCONTINUED | OUTPATIENT
Start: 2023-05-15 | End: 2023-05-15 | Stop reason: HOSPADM

## 2023-05-15 RX ORDER — GLYCOPYRROLATE 0.2 MG/ML
INJECTION INTRAMUSCULAR; INTRAVENOUS PRN
Status: DISCONTINUED | OUTPATIENT
Start: 2023-05-15 | End: 2023-05-15 | Stop reason: SDUPTHER

## 2023-05-15 RX ORDER — SODIUM CHLORIDE, SODIUM LACTATE, POTASSIUM CHLORIDE, CALCIUM CHLORIDE 600; 310; 30; 20 MG/100ML; MG/100ML; MG/100ML; MG/100ML
INJECTION, SOLUTION INTRAVENOUS CONTINUOUS
Status: DISCONTINUED | OUTPATIENT
Start: 2023-05-15 | End: 2023-05-15 | Stop reason: HOSPADM

## 2023-05-15 RX ORDER — SUCCINYLCHOLINE/SOD CL,ISO/PF 200MG/10ML
SYRINGE (ML) INTRAVENOUS PRN
Status: DISCONTINUED | OUTPATIENT
Start: 2023-05-15 | End: 2023-05-15 | Stop reason: SDUPTHER

## 2023-05-15 RX ORDER — SODIUM CHLORIDE 0.9 % (FLUSH) 0.9 %
5-40 SYRINGE (ML) INJECTION PRN
Status: DISCONTINUED | OUTPATIENT
Start: 2023-05-15 | End: 2023-05-15 | Stop reason: HOSPADM

## 2023-05-15 RX ORDER — SODIUM CHLORIDE 9 MG/ML
INJECTION, SOLUTION INTRAVENOUS PRN
Status: DISCONTINUED | OUTPATIENT
Start: 2023-05-15 | End: 2023-05-15 | Stop reason: HOSPADM

## 2023-05-15 RX ORDER — ONDANSETRON 2 MG/ML
4 INJECTION INTRAMUSCULAR; INTRAVENOUS
Status: DISCONTINUED | OUTPATIENT
Start: 2023-05-15 | End: 2023-05-15 | Stop reason: HOSPADM

## 2023-05-15 RX ORDER — OXYCODONE HYDROCHLORIDE 5 MG/1
5 TABLET ORAL PRN
Status: DISCONTINUED | OUTPATIENT
Start: 2023-05-15 | End: 2023-05-15 | Stop reason: HOSPADM

## 2023-05-15 RX ORDER — HYDROMORPHONE HYDROCHLORIDE 1 MG/ML
0.5 INJECTION, SOLUTION INTRAMUSCULAR; INTRAVENOUS; SUBCUTANEOUS EVERY 5 MIN PRN
Status: DISCONTINUED | OUTPATIENT
Start: 2023-05-15 | End: 2023-05-15 | Stop reason: HOSPADM

## 2023-05-15 RX ORDER — LIDOCAINE HYDROCHLORIDE 10 MG/ML
INJECTION, SOLUTION EPIDURAL; INFILTRATION; INTRACAUDAL; PERINEURAL
Status: DISCONTINUED
Start: 2023-05-15 | End: 2023-05-15 | Stop reason: HOSPADM

## 2023-05-15 RX ORDER — ONDANSETRON 2 MG/ML
INJECTION INTRAMUSCULAR; INTRAVENOUS PRN
Status: DISCONTINUED | OUTPATIENT
Start: 2023-05-15 | End: 2023-05-15 | Stop reason: SDUPTHER

## 2023-05-15 RX ORDER — FENTANYL CITRATE 50 UG/ML
50 INJECTION, SOLUTION INTRAMUSCULAR; INTRAVENOUS EVERY 5 MIN PRN
Status: DISCONTINUED | OUTPATIENT
Start: 2023-05-15 | End: 2023-05-15 | Stop reason: HOSPADM

## 2023-05-15 RX ORDER — LIDOCAINE HYDROCHLORIDE 20 MG/ML
INJECTION, SOLUTION EPIDURAL; INFILTRATION; INTRACAUDAL; PERINEURAL PRN
Status: DISCONTINUED | OUTPATIENT
Start: 2023-05-15 | End: 2023-05-15 | Stop reason: SDUPTHER

## 2023-05-15 RX ORDER — IPRATROPIUM BROMIDE AND ALBUTEROL SULFATE 2.5; .5 MG/3ML; MG/3ML
1 SOLUTION RESPIRATORY (INHALATION)
Status: DISCONTINUED | OUTPATIENT
Start: 2023-05-15 | End: 2023-05-15 | Stop reason: HOSPADM

## 2023-05-15 RX ORDER — EPHEDRINE SULFATE 50 MG/ML
INJECTION INTRAVENOUS PRN
Status: DISCONTINUED | OUTPATIENT
Start: 2023-05-15 | End: 2023-05-15 | Stop reason: SDUPTHER

## 2023-05-15 RX ORDER — DEXAMETHASONE SODIUM PHOSPHATE 4 MG/ML
INJECTION, SOLUTION INTRA-ARTICULAR; INTRALESIONAL; INTRAMUSCULAR; INTRAVENOUS; SOFT TISSUE PRN
Status: DISCONTINUED | OUTPATIENT
Start: 2023-05-15 | End: 2023-05-15 | Stop reason: SDUPTHER

## 2023-05-15 RX ORDER — HEPARIN SODIUM 200 [USP'U]/100ML
INJECTION, SOLUTION INTRAVENOUS CONTINUOUS PRN
Status: COMPLETED | OUTPATIENT
Start: 2023-05-15 | End: 2023-05-15

## 2023-05-15 RX ORDER — LORAZEPAM 2 MG/ML
0.5 INJECTION INTRAMUSCULAR
Status: DISCONTINUED | OUTPATIENT
Start: 2023-05-15 | End: 2023-05-15 | Stop reason: HOSPADM

## 2023-05-15 RX ORDER — HYDRALAZINE HYDROCHLORIDE 20 MG/ML
10 INJECTION INTRAMUSCULAR; INTRAVENOUS
Status: DISCONTINUED | OUTPATIENT
Start: 2023-05-15 | End: 2023-05-15 | Stop reason: HOSPADM

## 2023-05-15 RX ORDER — PROPOFOL 10 MG/ML
INJECTION, EMULSION INTRAVENOUS PRN
Status: DISCONTINUED | OUTPATIENT
Start: 2023-05-15 | End: 2023-05-15 | Stop reason: SDUPTHER

## 2023-05-15 RX ORDER — DIPHENHYDRAMINE HYDROCHLORIDE 50 MG/ML
12.5 INJECTION INTRAMUSCULAR; INTRAVENOUS
Status: DISCONTINUED | OUTPATIENT
Start: 2023-05-15 | End: 2023-05-15 | Stop reason: HOSPADM

## 2023-05-15 RX ORDER — SODIUM CHLORIDE 9 MG/ML
INJECTION, SOLUTION INTRAVENOUS CONTINUOUS
Status: DISCONTINUED | OUTPATIENT
Start: 2023-05-15 | End: 2023-05-15 | Stop reason: HOSPADM

## 2023-05-15 RX ORDER — ROCURONIUM BROMIDE 10 MG/ML
INJECTION, SOLUTION INTRAVENOUS PRN
Status: DISCONTINUED | OUTPATIENT
Start: 2023-05-15 | End: 2023-05-15 | Stop reason: SDUPTHER

## 2023-05-15 RX ORDER — PROTAMINE SULFATE 10 MG/ML
INJECTION, SOLUTION INTRAVENOUS PRN
Status: DISCONTINUED | OUTPATIENT
Start: 2023-05-15 | End: 2023-05-15 | Stop reason: SDUPTHER

## 2023-05-15 RX ORDER — OXYCODONE HYDROCHLORIDE 5 MG/1
10 TABLET ORAL PRN
Status: DISCONTINUED | OUTPATIENT
Start: 2023-05-15 | End: 2023-05-15 | Stop reason: HOSPADM

## 2023-05-15 RX ORDER — MEPERIDINE HYDROCHLORIDE 25 MG/ML
12.5 INJECTION INTRAMUSCULAR; INTRAVENOUS; SUBCUTANEOUS EVERY 5 MIN PRN
Status: DISCONTINUED | OUTPATIENT
Start: 2023-05-15 | End: 2023-05-15 | Stop reason: HOSPADM

## 2023-05-15 RX ORDER — METOCLOPRAMIDE HYDROCHLORIDE 5 MG/ML
10 INJECTION INTRAMUSCULAR; INTRAVENOUS
Status: DISCONTINUED | OUTPATIENT
Start: 2023-05-15 | End: 2023-05-15 | Stop reason: HOSPADM

## 2023-05-15 RX ORDER — LABETALOL HYDROCHLORIDE 5 MG/ML
10 INJECTION, SOLUTION INTRAVENOUS
Status: DISCONTINUED | OUTPATIENT
Start: 2023-05-15 | End: 2023-05-15 | Stop reason: HOSPADM

## 2023-05-15 RX ORDER — ADENOSINE 3 MG/ML
INJECTION, SOLUTION INTRAVENOUS PRN
Status: DISCONTINUED | OUTPATIENT
Start: 2023-05-15 | End: 2023-05-15 | Stop reason: SDUPTHER

## 2023-05-15 RX ORDER — HEPARIN SODIUM 1000 [USP'U]/ML
INJECTION, SOLUTION INTRAVENOUS; SUBCUTANEOUS PRN
Status: DISCONTINUED | OUTPATIENT
Start: 2023-05-15 | End: 2023-05-15 | Stop reason: SDUPTHER

## 2023-05-15 RX ORDER — ACETAMINOPHEN 325 MG/1
650 TABLET ORAL EVERY 4 HOURS PRN
Status: DISCONTINUED | OUTPATIENT
Start: 2023-05-15 | End: 2023-05-15 | Stop reason: HOSPADM

## 2023-05-15 RX ADMIN — LIDOCAINE HYDROCHLORIDE 100 MG: 20 INJECTION, SOLUTION EPIDURAL; INFILTRATION; INTRACAUDAL; PERINEURAL at 09:45

## 2023-05-15 RX ADMIN — EPHEDRINE SULFATE 10 MG: 50 INJECTION INTRAVENOUS at 10:12

## 2023-05-15 RX ADMIN — Medication 120 MG: at 09:45

## 2023-05-15 RX ADMIN — ROCURONIUM BROMIDE 10 MG: 10 INJECTION, SOLUTION INTRAVENOUS at 09:45

## 2023-05-15 RX ADMIN — SODIUM CHLORIDE: 9 INJECTION, SOLUTION INTRAVENOUS at 12:21

## 2023-05-15 RX ADMIN — FENTANYL CITRATE 50 MCG: 50 INJECTION, SOLUTION INTRAMUSCULAR; INTRAVENOUS at 09:45

## 2023-05-15 RX ADMIN — HEPARIN SODIUM 18 UNITS/KG/HR: 10000 INJECTION, SOLUTION INTRAVENOUS at 10:22

## 2023-05-15 RX ADMIN — SODIUM CHLORIDE: 9 INJECTION, SOLUTION INTRAVENOUS at 09:13

## 2023-05-15 RX ADMIN — PHENYLEPHRINE HYDROCHLORIDE 50 MCG/MIN: 10 INJECTION INTRAVENOUS at 09:55

## 2023-05-15 RX ADMIN — ROCURONIUM BROMIDE 40 MG: 10 INJECTION, SOLUTION INTRAVENOUS at 09:50

## 2023-05-15 RX ADMIN — SODIUM CHLORIDE: 9 INJECTION, SOLUTION INTRAVENOUS at 09:37

## 2023-05-15 RX ADMIN — PROTAMINE SULFATE 30 MG: 10 INJECTION, SOLUTION INTRAVENOUS at 12:42

## 2023-05-15 RX ADMIN — GLYCOPYRROLATE 0.2 MG: 0.2 INJECTION INTRAMUSCULAR; INTRAVENOUS at 09:45

## 2023-05-15 RX ADMIN — EPHEDRINE SULFATE 10 MG: 50 INJECTION INTRAVENOUS at 09:51

## 2023-05-15 RX ADMIN — SUGAMMADEX 200 MG: 100 INJECTION, SOLUTION INTRAVENOUS at 12:19

## 2023-05-15 RX ADMIN — DEXAMETHASONE SODIUM PHOSPHATE 4 MG: 4 INJECTION, SOLUTION INTRA-ARTICULAR; INTRALESIONAL; INTRAMUSCULAR; INTRAVENOUS; SOFT TISSUE at 09:51

## 2023-05-15 RX ADMIN — ONDANSETRON 4 MG: 2 INJECTION INTRAMUSCULAR; INTRAVENOUS at 09:53

## 2023-05-15 RX ADMIN — EPHEDRINE SULFATE 10 MG: 50 INJECTION INTRAVENOUS at 11:32

## 2023-05-15 RX ADMIN — EPHEDRINE SULFATE 10 MG: 50 INJECTION INTRAVENOUS at 09:45

## 2023-05-15 RX ADMIN — HEPARIN SODIUM 11300 UNITS: 1000 INJECTION, SOLUTION INTRAVENOUS; SUBCUTANEOUS at 10:02

## 2023-05-15 RX ADMIN — FENTANYL CITRATE 50 MCG: 50 INJECTION, SOLUTION INTRAMUSCULAR; INTRAVENOUS at 12:17

## 2023-05-15 RX ADMIN — PROPOFOL 100 MG: 10 INJECTION, EMULSION INTRAVENOUS at 09:45

## 2023-05-15 RX ADMIN — ADENOSINE 12 MG: 3 INJECTION, SOLUTION INTRAVENOUS at 12:04

## 2023-05-15 ASSESSMENT — PAIN SCALES - GENERAL
PAINLEVEL_OUTOF10: 0

## 2023-05-15 ASSESSMENT — PAIN - FUNCTIONAL ASSESSMENT: PAIN_FUNCTIONAL_ASSESSMENT: NONE - DENIES PAIN

## 2023-05-15 NOTE — ANESTHESIA PROCEDURE NOTES
Arterial Line:    An arterial line was placed using surface landmarks, in the pre-op for the following indication(s): continuous blood pressure monitoring and blood sampling needed. A 20 gauge (size), 1 and 3/4 inch (length), Angiocath (type) catheter was placed, Seldinger technique used, into the left radial artery, secured by tape and Tegaderm. Anesthesia type: Local  Local infiltration: Injection    Events:  patient tolerated procedure well with no complications. Additional notes:  Patient identified using two patient identifiers. Arterial site identified and prepped in a sterile fashion. Lidocaine skin wheal administered as needed. Arterial site cannulated. Arterial line connecter to transducer to verify proper placement. Non complications noted. 5/15/2023 8:55 AM5/15/5 9:05 AM  Anesthesiologist: Radha Sam MD  Performed: Anesthesiologist   Preanesthetic Checklist  Completed: patient identified, IV checked, site marked, risks and benefits discussed, surgical/procedural consents, equipment checked, pre-op evaluation, timeout performed, anesthesia consent given, oxygen available, monitors applied/VS acknowledged, fire risk safety assessment completed and verbalized and blood product R/B/A discussed and consented

## 2023-05-15 NOTE — PERIOP NOTE
Family update was done   (Ntfd son that pt would be in PACU for a while  explained to son pt had 2 tr bands and it takes time to take the bands off

## 2023-05-15 NOTE — ANESTHESIA PROCEDURE NOTES
Arterial Line:    An arterial line was placed using surface landmarks, in the pre-op for the following indication(s): continuous blood pressure monitoring and blood sampling needed. A 20 gauge (size), 1 and 3/4 inch (length), Angiocath (type) catheter was placed, Seldinger technique used, into the radial artery, secured by tape and Tegaderm. Anesthesia type: Local  Local infiltration: Injection    Events:  patient tolerated procedure well with no complications. Additional notes:  Patient identified using two patient identifiers. Arterial site identified and prepped in a sterile fashion. Lidocaine skin wheal administered as needed. Arterial site cannulated. Arterial line connecter to transducer to verify proper placement. Non complications noted.   Anesthesiologist: Rustam Pulido MD  Performed: Anesthesiologist   Preanesthetic Checklist  Completed: patient identified, IV checked, site marked, risks and benefits discussed, surgical/procedural consents, equipment checked, pre-op evaluation, timeout performed, anesthesia consent given, oxygen available, monitors applied/VS acknowledged, fire risk safety assessment completed and verbalized and blood product R/B/A discussed and consented

## 2023-05-15 NOTE — DISCHARGE INSTRUCTIONS
Procedure name: You had an atrial fibrillation and flutter ablation with Dr. Danita Miller on 05/15/23. Activity restrictions for the next 5 days:    - No lifting greater than 10 pounds  - Do no strain or participate in vigorous activity  - Do not sit in a bathtub, hot tub, or go into a swimming pool. Driving Instructions:    - No driving for 24 hours after your procedure    Symptom management - What to expect:    - Soreness or tenderness at the site that may last for several weeks. - Bruising at the site that may take 2-3 weeks to go away. - A small lump or bump (dime to quarter size), which may last up to 6 weeks. Please let us know if you have new or increasing pain at the groin site. - For MINOR pain: You may take acetaminophen (Tylenol®) 325 mg tablets every 4-6 hours. You may place an ice pack or warm pack over the site for 20 minutes every 2 hours. Gently wipe the site after you remove the pack if it is wet. - If you are prescribed colchicine (an anti-inflammatory) after your ablation, please be aware this could cause loose stools. If you develop loose stools, please decrease your dose to 1 tablet daily. If loose stools continue after you reduce your dose, then you may discontinue this medication. What you may feel after the ablation:    - In the first 3-6 months after ablation it is not uncommon to experience transient recurrence of atrial fibrillation. [facility] if these episodes last longer than 24 hours or if they are causing bothersome symptoms.  - For several days to weeks following the ablation it is not uncommon for patients to feel new palpitations, sense of heart beat irregularly, and/or as though the arrhythmia is \"trying\" to recur. This usually resolves with time, as the inflammation and irritation improve from procedure. - Please let us know if your symptoms are increasingly bothersome or persistent.     Wound care:    - You may shower 24 hours after the

## 2023-05-15 NOTE — PERIOP NOTE
On arrival to [facility] hooked external catheter urine looks red in color   Versette External Catheter changed   Dr Fer Ford called to check on pt nftd him of urine color

## 2023-05-15 NOTE — PROGRESS NOTES
IV removed-- tip intact, no redness, swelling or bleeding noted. VSS. Patient in no acute distress. DC instructions reviewed with patient and son      -- verbalized understanding. Patient wheeled out to private vehicle-- no distress noted. Patient voided prior to discharge. Urine remains red/pink tinged. Patient states history of frequent UTI's. Educated patient to monitor at home and contact PCP if experiencing urinary frequency, burning, itching, fever. Bilateral groins and left wrist clean dry intact. [facility] brochure given. Patient has follow up already scheduled.

## 2023-05-15 NOTE — ANESTHESIA PRE PROCEDURE
Department of Anesthesiology  Preprocedure Note       Name:  Leonardo Youssef   Age:  65 y.o.  :  1945                                          MRN:  [id number]         Date:  5/15/2023      Surgeon: Yvrose Estes):  Channing Calero MD    Procedure: Procedure(s):  Ablation A-fib w complete ep study    Medications prior to admission:   Prior to Admission medications    Medication Sig Start Date End Date Taking?  Authorizing Provider   amiodarone (CORDARONE) 200 MG tablet Take 1 tablet by mouth 2 times daily 23   Navid Servin MD   metoprolol tartrate (LOPRESSOR) 25 MG tablet Take 1 tablet by mouth 2 times daily 23   Navid Servin MD   vitamin B-12 (CYANOCOBALAMIN) 500 MCG tablet Take 1 tablet by mouth daily    Historical Provider, MD   apixaban (ELIQUIS) 5 MG TABS tablet Take 1 tablet by mouth 2 times daily    Historical Provider, MD   atorvastatin (LIPITOR) 20 MG tablet Take 1 tablet by mouth daily Daily before bed 21   Historical Provider, MD   vitamin D3 (CHOLECALCIFEROL) 125 MCG (5000 UT) TABS tablet Take 1 tablet by mouth daily    Historical Provider, MD   cholestyramine light 4 g packet Take 5.5 g by mouth daily as needed 21   Historical Provider, MD   levothyroxine (SYNTHROID) 50 MCG tablet Take 1 tablet by mouth every morning (before breakfast) 7/5/15   Historical Provider, MD   metFORMIN (GLUCOPHAGE) 500 MG tablet Take 1 tablet by mouth 23   Historical Provider, MD   omeprazole (PRILOSEC) 40 MG delayed release capsule Take 1 capsule by mouth daily Daily at bedtime 7/5/15   Historical Provider, MD   spironolactone (ALDACTONE) 25 MG tablet Take 1 tablet by mouth daily    Historical Provider, MD       Current medications:    Current Facility-Administered Medications   Medication Dose Route Frequency Provider Last Rate Last Admin    0.9 % sodium chloride infusion   IntraVENous Continuous Ronaldo Martinez MD        lidocaine PF 1 % injection                Allergies:  No Known

## 2023-05-15 NOTE — PROGRESS NOTES
Patient with red urine in canister of pure-wick device for urine incontinence. No signs of bleeding from bilateral groins. Seems to have residual blood from procedure that seeped into vaginal area. Suction canister and pure-wick catheter changed. Will continue to monitor UOP and color. Left wrist dressing clean dry intact. Pulses palpable, skin mottled. Bilateral groin dressings clean dry intact. Call bell within reach, snack/drink given.

## 2023-05-15 NOTE — PERIOP NOTE
Pt  on arrival to PACU has 2 tr bands on left left lower arm   (Pt had arterial line in EP lab arterial line removed in EP lab tr bands placed on in EP lab to control bleeding)

## 2023-05-15 NOTE — ANESTHESIA PROCEDURE NOTES
Arterial Line:    An arterial line was placed using surface landmarks, in the pre-op for the following indication(s): continuous blood pressure monitoring and blood sampling needed. A 20 gauge (size), 1 and 3/4 inch (length), Angiocath (type) catheter was placed, Seldinger technique used, into the radial artery, secured by tape and Tegaderm. Anesthesia type: Local    Events:  patient tolerated procedure well with no complications.   Anesthesiologist: Soto Timmons MD  Performed: Anesthesiologist   Preanesthetic Checklist  Completed: patient identified, IV checked, site marked, risks and benefits discussed, surgical/procedural consents, equipment checked, pre-op evaluation, timeout performed, anesthesia consent given, oxygen available, monitors applied/VS acknowledged, fire risk safety assessment completed and verbalized and blood product R/B/A discussed and consented

## 2023-05-16 LAB
EKG ATRIAL RATE: 81 BPM
EKG DIAGNOSIS: NORMAL
EKG P AXIS: -8 DEGREES
EKG P-R INTERVAL: 148 MS
EKG Q-T INTERVAL: 408 MS
EKG QRS DURATION: 72 MS
EKG QTC CALCULATION (BAZETT): 473 MS
EKG R AXIS: 27 DEGREES
EKG T AXIS: -4 DEGREES
EKG VENTRICULAR RATE: 81 BPM

## 2023-05-21 ENCOUNTER — APPOINTMENT (OUTPATIENT)
Facility: HOSPITAL | Age: 78
End: 2023-05-21
Payer: MEDICARE

## 2023-05-21 ENCOUNTER — HOSPITAL ENCOUNTER (EMERGENCY)
Facility: HOSPITAL | Age: 78
Discharge: ANOTHER HOSPITAL | End: 2023-05-21
Attending: EMERGENCY MEDICINE
Payer: MEDICARE

## 2023-05-21 ENCOUNTER — HOSPITAL ENCOUNTER (INPATIENT)
Facility: HOSPITAL | Age: 78
LOS: 6 days | Discharge: NURSING, REHAB OR LONG-TERM CARE | End: 2023-05-27
Attending: STUDENT IN AN ORGANIZED HEALTH CARE EDUCATION/TRAINING PROGRAM | Admitting: EMERGENCY MEDICINE
Payer: MEDICARE

## 2023-05-21 VITALS
SYSTOLIC BLOOD PRESSURE: 179 MMHG | WEIGHT: 220.6 LBS | OXYGEN SATURATION: 95 % | TEMPERATURE: 98.1 F | BODY MASS INDEX: 39.09 KG/M2 | DIASTOLIC BLOOD PRESSURE: 66 MMHG | HEIGHT: 63 IN | HEART RATE: 69 BPM | RESPIRATION RATE: 20 BRPM

## 2023-05-21 DIAGNOSIS — I61.9 ACUTE SPONT INTRAPARENCHYMAL HEMORRHAGE ASSOC W/ COAGULOPATHY (HCC): Primary | ICD-10-CM

## 2023-05-21 DIAGNOSIS — D68.9 ACUTE SPONT INTRAPARENCHYMAL HEMORRHAGE ASSOC W/ COAGULOPATHY (HCC): Primary | ICD-10-CM

## 2023-05-21 DIAGNOSIS — R53.1 ACUTE RIGHT-SIDED WEAKNESS: ICD-10-CM

## 2023-05-21 DIAGNOSIS — I61.9 INTRAPARENCHYMAL HEMORRHAGE OF BRAIN (HCC): Primary | ICD-10-CM

## 2023-05-21 LAB
ALBUMIN SERPL-MCNC: 3 G/DL (ref 3.5–5)
ALBUMIN/GLOB SERPL: 0.7 (ref 1.1–2.2)
ALP SERPL-CCNC: 77 U/L (ref 45–117)
ALT SERPL-CCNC: 29 U/L (ref 12–78)
ANION GAP SERPL CALC-SCNC: 8 MMOL/L (ref 5–15)
AST SERPL W P-5'-P-CCNC: ABNORMAL U/L (ref 15–37)
BILIRUB SERPL-MCNC: 0.6 MG/DL (ref 0.2–1)
BUN SERPL-MCNC: 10 MG/DL (ref 6–20)
BUN/CREAT SERPL: 15 (ref 12–20)
CA-I BLD-MCNC: 8.5 MG/DL (ref 8.5–10.1)
CHLORIDE SERPL-SCNC: 103 MMOL/L (ref 97–108)
CHP ED QC CHECK: NORMAL
CO2 SERPL-SCNC: 29 MMOL/L (ref 21–32)
CREAT SERPL-MCNC: 0.68 MG/DL (ref 0.55–1.02)
EKG ATRIAL RATE: 67 BPM
EKG DIAGNOSIS: NORMAL
EKG P AXIS: 31 DEGREES
EKG P-R INTERVAL: 156 MS
EKG Q-T INTERVAL: 454 MS
EKG QRS DURATION: 70 MS
EKG QTC CALCULATION (BAZETT): 479 MS
EKG R AXIS: 5 DEGREES
EKG T AXIS: 47 DEGREES
EKG VENTRICULAR RATE: 67 BPM
GLOBULIN SER CALC-MCNC: 4.3 G/DL (ref 2–4)
GLUCOSE BLD-MCNC: 129 MG/DL
GLUCOSE SERPL-MCNC: 132 MG/DL (ref 65–100)
INR PPP: 1.5 (ref 0.9–1.1)
POTASSIUM SERPL-SCNC: ABNORMAL MMOL/L (ref 3.5–5.1)
PROT SERPL-MCNC: 7.3 G/DL (ref 6.4–8.2)
PROTHROMBIN TIME: 18.1 SEC (ref 11.9–14.6)
SODIUM SERPL-SCNC: 140 MMOL/L (ref 136–145)
SODIUM SERPL-SCNC: 140 MMOL/L (ref 136–145)
TROPONIN I SERPL HS-MCNC: 74 NG/L (ref 0–51)

## 2023-05-21 PROCEDURE — A9579 GAD-BASE MR CONTRAST NOS,1ML: HCPCS | Performed by: STUDENT IN AN ORGANIZED HEALTH CARE EDUCATION/TRAINING PROGRAM

## 2023-05-21 PROCEDURE — C9113 INJ PANTOPRAZOLE SODIUM, VIA: HCPCS | Performed by: NURSE PRACTITIONER

## 2023-05-21 PROCEDURE — 99285 EMERGENCY DEPT VISIT HI MDM: CPT

## 2023-05-21 PROCEDURE — 6360000002 HC RX W HCPCS: Performed by: NURSE PRACTITIONER

## 2023-05-21 PROCEDURE — 2500000003 HC RX 250 WO HCPCS

## 2023-05-21 PROCEDURE — 2580000003 HC RX 258

## 2023-05-21 PROCEDURE — 84484 ASSAY OF TROPONIN QUANT: CPT

## 2023-05-21 PROCEDURE — 2580000003 HC RX 258: Performed by: NURSE PRACTITIONER

## 2023-05-21 PROCEDURE — 2580000003 HC RX 258: Performed by: EMERGENCY MEDICINE

## 2023-05-21 PROCEDURE — 36415 COLL VENOUS BLD VENIPUNCTURE: CPT

## 2023-05-21 PROCEDURE — 6360000002 HC RX W HCPCS: Performed by: EMERGENCY MEDICINE

## 2023-05-21 PROCEDURE — 85610 PROTHROMBIN TIME: CPT

## 2023-05-21 PROCEDURE — 82962 GLUCOSE BLOOD TEST: CPT

## 2023-05-21 PROCEDURE — 84295 ASSAY OF SERUM SODIUM: CPT

## 2023-05-21 PROCEDURE — 96375 TX/PRO/DX INJ NEW DRUG ADDON: CPT

## 2023-05-21 PROCEDURE — 70553 MRI BRAIN STEM W/O & W/DYE: CPT

## 2023-05-21 PROCEDURE — 96374 THER/PROPH/DIAG INJ IV PUSH: CPT

## 2023-05-21 PROCEDURE — 2500000003 HC RX 250 WO HCPCS: Performed by: NURSE PRACTITIONER

## 2023-05-21 PROCEDURE — A4216 STERILE WATER/SALINE, 10 ML: HCPCS | Performed by: NURSE PRACTITIONER

## 2023-05-21 PROCEDURE — 70450 CT HEAD/BRAIN W/O DYE: CPT

## 2023-05-21 PROCEDURE — 2500000003 HC RX 250 WO HCPCS: Performed by: EMERGENCY MEDICINE

## 2023-05-21 PROCEDURE — 2000000000 HC ICU R&B

## 2023-05-21 PROCEDURE — 93005 ELECTROCARDIOGRAM TRACING: CPT | Performed by: EMERGENCY MEDICINE

## 2023-05-21 PROCEDURE — APPNB60 APP NON BILLABLE TIME 46-60 MINS: Performed by: NURSE PRACTITIONER

## 2023-05-21 PROCEDURE — 6360000004 HC RX CONTRAST MEDICATION: Performed by: RADIOLOGY

## 2023-05-21 PROCEDURE — 80053 COMPREHEN METABOLIC PANEL: CPT

## 2023-05-21 PROCEDURE — 93010 ELECTROCARDIOGRAM REPORT: CPT | Performed by: INTERNAL MEDICINE

## 2023-05-21 PROCEDURE — 6360000004 HC RX CONTRAST MEDICATION: Performed by: STUDENT IN AN ORGANIZED HEALTH CARE EDUCATION/TRAINING PROGRAM

## 2023-05-21 PROCEDURE — 70498 CT ANGIOGRAPHY NECK: CPT

## 2023-05-21 RX ORDER — LABETALOL HYDROCHLORIDE 5 MG/ML
5 INJECTION, SOLUTION INTRAVENOUS EVERY 4 HOURS PRN
Status: DISCONTINUED | OUTPATIENT
Start: 2023-05-21 | End: 2023-05-21

## 2023-05-21 RX ORDER — ONDANSETRON 2 MG/ML
4 INJECTION INTRAMUSCULAR; INTRAVENOUS EVERY 6 HOURS PRN
Status: DISCONTINUED | OUTPATIENT
Start: 2023-05-21 | End: 2023-05-27 | Stop reason: HOSPADM

## 2023-05-21 RX ORDER — ATORVASTATIN CALCIUM 20 MG/1
20 TABLET, FILM COATED ORAL DAILY
Status: DISCONTINUED | OUTPATIENT
Start: 2023-05-21 | End: 2023-05-27 | Stop reason: HOSPADM

## 2023-05-21 RX ORDER — LEVOTHYROXINE SODIUM 0.05 MG/1
50 TABLET ORAL
Status: DISCONTINUED | OUTPATIENT
Start: 2023-05-22 | End: 2023-05-27 | Stop reason: HOSPADM

## 2023-05-21 RX ORDER — DEXTROSE MONOHYDRATE 100 MG/ML
INJECTION, SOLUTION INTRAVENOUS CONTINUOUS PRN
Status: DISCONTINUED | OUTPATIENT
Start: 2023-05-21 | End: 2023-05-27 | Stop reason: HOSPADM

## 2023-05-21 RX ORDER — SODIUM CHLORIDE 9 MG/ML
50 INJECTION, SOLUTION INTRAVENOUS ONCE
Status: COMPLETED | OUTPATIENT
Start: 2023-05-21 | End: 2023-05-21

## 2023-05-21 RX ORDER — HYDRALAZINE HYDROCHLORIDE 20 MG/ML
10 INJECTION INTRAMUSCULAR; INTRAVENOUS EVERY 6 HOURS PRN
Status: DISCONTINUED | OUTPATIENT
Start: 2023-05-21 | End: 2023-05-27 | Stop reason: HOSPADM

## 2023-05-21 RX ORDER — ONDANSETRON 4 MG/1
4 TABLET, ORALLY DISINTEGRATING ORAL EVERY 8 HOURS PRN
Status: DISCONTINUED | OUTPATIENT
Start: 2023-05-21 | End: 2023-05-27 | Stop reason: HOSPADM

## 2023-05-21 RX ORDER — HYDRALAZINE HYDROCHLORIDE 20 MG/ML
5 INJECTION INTRAMUSCULAR; INTRAVENOUS EVERY 6 HOURS PRN
Status: DISCONTINUED | OUTPATIENT
Start: 2023-05-21 | End: 2023-05-21

## 2023-05-21 RX ORDER — INSULIN LISPRO 100 [IU]/ML
0-8 INJECTION, SOLUTION INTRAVENOUS; SUBCUTANEOUS EVERY 6 HOURS
Status: DISCONTINUED | OUTPATIENT
Start: 2023-05-22 | End: 2023-05-27 | Stop reason: HOSPADM

## 2023-05-21 RX ORDER — SODIUM CHLORIDE 9 MG/ML
INJECTION, SOLUTION INTRAVENOUS CONTINUOUS
Status: DISCONTINUED | OUTPATIENT
Start: 2023-05-21 | End: 2023-05-24

## 2023-05-21 RX ORDER — LABETALOL HYDROCHLORIDE 5 MG/ML
10 INJECTION, SOLUTION INTRAVENOUS EVERY 4 HOURS PRN
Status: DISCONTINUED | OUTPATIENT
Start: 2023-05-21 | End: 2023-05-27 | Stop reason: HOSPADM

## 2023-05-21 RX ORDER — LABETALOL HYDROCHLORIDE 5 MG/ML
10 INJECTION, SOLUTION INTRAVENOUS EVERY 4 HOURS PRN
Status: DISCONTINUED | OUTPATIENT
Start: 2023-05-21 | End: 2023-05-21

## 2023-05-21 RX ORDER — ACETAMINOPHEN 325 MG/1
650 TABLET ORAL EVERY 4 HOURS PRN
Status: DISCONTINUED | OUTPATIENT
Start: 2023-05-21 | End: 2023-05-27 | Stop reason: HOSPADM

## 2023-05-21 RX ORDER — LEVETIRACETAM 500 MG/5ML
500 INJECTION, SOLUTION, CONCENTRATE INTRAVENOUS EVERY 12 HOURS
Status: DISCONTINUED | OUTPATIENT
Start: 2023-05-21 | End: 2023-05-24

## 2023-05-21 RX ORDER — 3% SODIUM CHLORIDE 3 G/100ML
50 INJECTION, SOLUTION INTRAVENOUS CONTINUOUS
Status: DISCONTINUED | OUTPATIENT
Start: 2023-05-21 | End: 2023-05-23

## 2023-05-21 RX ORDER — HYDRALAZINE HYDROCHLORIDE 20 MG/ML
10 INJECTION INTRAMUSCULAR; INTRAVENOUS EVERY 6 HOURS PRN
Status: DISCONTINUED | OUTPATIENT
Start: 2023-05-21 | End: 2023-05-21

## 2023-05-21 RX ORDER — SODIUM CHLORIDE, SODIUM LACTATE, POTASSIUM CHLORIDE, CALCIUM CHLORIDE 600; 310; 30; 20 MG/100ML; MG/100ML; MG/100ML; MG/100ML
INJECTION, SOLUTION INTRAVENOUS CONTINUOUS
Status: DISCONTINUED | OUTPATIENT
Start: 2023-05-21 | End: 2023-05-21

## 2023-05-21 RX ORDER — HYDRALAZINE HYDROCHLORIDE 20 MG/ML
INJECTION INTRAMUSCULAR; INTRAVENOUS
Status: DISPENSED
Start: 2023-05-21 | End: 2023-05-22

## 2023-05-21 RX ORDER — LABETALOL HYDROCHLORIDE 5 MG/ML
10 INJECTION, SOLUTION INTRAVENOUS
Status: COMPLETED | OUTPATIENT
Start: 2023-05-21 | End: 2023-05-21

## 2023-05-21 RX ADMIN — SODIUM CHLORIDE 2.5 MG/HR: 9 INJECTION, SOLUTION INTRAVENOUS at 22:00

## 2023-05-21 RX ADMIN — LEVETIRACETAM 500 MG: 100 INJECTION, SOLUTION INTRAVENOUS at 14:41

## 2023-05-21 RX ADMIN — SODIUM CHLORIDE: 9 INJECTION, SOLUTION INTRAVENOUS at 14:42

## 2023-05-21 RX ADMIN — SODIUM CHLORIDE 2.5 MG/HR: 9 INJECTION, SOLUTION INTRAVENOUS at 19:15

## 2023-05-21 RX ADMIN — SODIUM CHLORIDE 50 ML: 900 INJECTION, SOLUTION INTRAVENOUS at 12:55

## 2023-05-21 RX ADMIN — GADOTERIDOL 20 ML: 279.3 INJECTION, SOLUTION INTRAVENOUS at 18:36

## 2023-05-21 RX ADMIN — LABETALOL HYDROCHLORIDE 10 MG: 5 INJECTION INTRAVENOUS at 12:34

## 2023-05-21 RX ADMIN — IOPAMIDOL 100 ML: 755 INJECTION, SOLUTION INTRAVENOUS at 14:42

## 2023-05-21 RX ADMIN — PROTHROMBIN, COAGULATION FACTOR VII HUMAN, COAGULATION FACTOR IX HUMAN, COAGULATION FACTOR X HUMAN, PROTEIN C, PROTEIN S HUMAN, AND WATER 2344 UNITS: KIT at 12:55

## 2023-05-21 RX ADMIN — HYDRALAZINE HYDROCHLORIDE 5 MG: 20 INJECTION INTRAMUSCULAR; INTRAVENOUS at 17:49

## 2023-05-21 RX ADMIN — SODIUM CHLORIDE 25 ML/HR: 3 INJECTION, SOLUTION INTRAVENOUS at 20:47

## 2023-05-21 RX ADMIN — HYDRALAZINE HYDROCHLORIDE 5 MG: 20 INJECTION INTRAMUSCULAR; INTRAVENOUS at 15:39

## 2023-05-21 RX ADMIN — PANTOPRAZOLE SODIUM 40 MG: 40 INJECTION, POWDER, FOR SOLUTION INTRAVENOUS at 20:47

## 2023-05-21 ASSESSMENT — PAIN - FUNCTIONAL ASSESSMENT: PAIN_FUNCTIONAL_ASSESSMENT: NONE - DENIES PAIN

## 2023-05-21 ASSESSMENT — LIFESTYLE VARIABLES
HOW MANY STANDARD DRINKS CONTAINING ALCOHOL DO YOU HAVE ON A TYPICAL DAY: PATIENT DOES NOT DRINK
HOW OFTEN DO YOU HAVE A DRINK CONTAINING ALCOHOL: NEVER

## 2023-05-21 ASSESSMENT — PAIN SCALES - GENERAL: PAINLEVEL_OUTOF10: 0

## 2023-05-22 ENCOUNTER — APPOINTMENT (OUTPATIENT)
Facility: HOSPITAL | Age: 78
End: 2023-05-22
Payer: MEDICARE

## 2023-05-22 LAB
ALBUMIN SERPL-MCNC: 3 G/DL (ref 3.5–5)
ALBUMIN SERPL-MCNC: 3 G/DL (ref 3.5–5)
ALBUMIN/GLOB SERPL: 0.8 (ref 1.1–2.2)
ALBUMIN/GLOB SERPL: 0.9 (ref 1.1–2.2)
ALP SERPL-CCNC: 66 U/L (ref 45–117)
ALP SERPL-CCNC: 68 U/L (ref 45–117)
ALT SERPL-CCNC: 26 U/L (ref 12–78)
ALT SERPL-CCNC: 27 U/L (ref 12–78)
ANION GAP SERPL CALC-SCNC: 4 MMOL/L (ref 5–15)
ANION GAP SERPL CALC-SCNC: 5 MMOL/L (ref 5–15)
ANION GAP SERPL CALC-SCNC: 6 MMOL/L (ref 5–15)
ANION GAP SERPL CALC-SCNC: 8 MMOL/L (ref 5–15)
AST SERPL-CCNC: 18 U/L (ref 15–37)
AST SERPL-CCNC: 26 U/L (ref 15–37)
BILIRUB SERPL-MCNC: 0.6 MG/DL (ref 0.2–1)
BILIRUB SERPL-MCNC: 0.7 MG/DL (ref 0.2–1)
BUN SERPL-MCNC: 7 MG/DL (ref 6–20)
BUN SERPL-MCNC: 8 MG/DL (ref 6–20)
BUN/CREAT SERPL: 11 (ref 12–20)
BUN/CREAT SERPL: 15 (ref 12–20)
CALCIUM SERPL-MCNC: 7.7 MG/DL (ref 8.5–10.1)
CALCIUM SERPL-MCNC: 8 MG/DL (ref 8.5–10.1)
CALCIUM SERPL-MCNC: 8.2 MG/DL (ref 8.5–10.1)
CALCIUM SERPL-MCNC: 8.4 MG/DL (ref 8.5–10.1)
CHLORIDE SERPL-SCNC: 104 MMOL/L (ref 97–108)
CHLORIDE SERPL-SCNC: 104 MMOL/L (ref 97–108)
CHLORIDE SERPL-SCNC: 106 MMOL/L (ref 97–108)
CHLORIDE SERPL-SCNC: 110 MMOL/L (ref 97–108)
CHOLEST SERPL-MCNC: 93 MG/DL
CO2 SERPL-SCNC: 26 MMOL/L (ref 21–32)
CO2 SERPL-SCNC: 27 MMOL/L (ref 21–32)
CO2 SERPL-SCNC: 30 MMOL/L (ref 21–32)
CO2 SERPL-SCNC: 31 MMOL/L (ref 21–32)
CREAT SERPL-MCNC: 0.53 MG/DL (ref 0.55–1.02)
CREAT SERPL-MCNC: 0.54 MG/DL (ref 0.55–1.02)
CREAT SERPL-MCNC: 0.54 MG/DL (ref 0.55–1.02)
CREAT SERPL-MCNC: 0.64 MG/DL (ref 0.55–1.02)
ECHO AO ROOT DIAM: 3.1 CM
ECHO AO ROOT INDEX: 1.57 CM/M2
ECHO AV PEAK GRADIENT: 13 MMHG
ECHO AV PEAK VELOCITY: 1.8 M/S
ECHO AV VELOCITY RATIO: 0.67
ECHO BSA: 2.05 M2
ECHO EST RA PRESSURE: 3 MMHG
ECHO LA DIAMETER INDEX: 2.08 CM/M2
ECHO LA DIAMETER: 4.1 CM
ECHO LA TO AORTIC ROOT RATIO: 1.32
ECHO LA VOL 2C: 49 ML (ref 22–52)
ECHO LA VOL 2C: 50 ML (ref 22–52)
ECHO LA VOL 4C: 52 ML (ref 22–52)
ECHO LA VOL 4C: 55 ML (ref 22–52)
ECHO LA VOLUME AREA LENGTH: 58 ML
ECHO LA VOLUME INDEX AREA LENGTH: 29 ML/M2 (ref 16–34)
ECHO LV E' LATERAL VELOCITY: 10 CM/S
ECHO LV E' SEPTAL VELOCITY: 6 CM/S
ECHO LV FRACTIONAL SHORTENING: 41 % (ref 28–44)
ECHO LV INTERNAL DIMENSION DIASTOLE INDEX: 2.34 CM/M2
ECHO LV INTERNAL DIMENSION DIASTOLIC: 4.6 CM (ref 3.9–5.3)
ECHO LV INTERNAL DIMENSION SYSTOLIC INDEX: 1.37 CM/M2
ECHO LV INTERNAL DIMENSION SYSTOLIC: 2.7 CM
ECHO LV IVSD: 0.9 CM (ref 0.6–0.9)
ECHO LV MASS 2D: 158.8 G (ref 67–162)
ECHO LV MASS INDEX 2D: 80.6 G/M2 (ref 43–95)
ECHO LV POSTERIOR WALL DIASTOLIC: 1.1 CM (ref 0.6–0.9)
ECHO LV RELATIVE WALL THICKNESS RATIO: 0.48
ECHO LVOT PEAK GRADIENT: 6 MMHG
ECHO LVOT PEAK VELOCITY: 1.2 M/S
ECHO MV A VELOCITY: 0.64 M/S
ECHO MV AREA PHT: 4.9 CM2
ECHO MV E DECELERATION TIME (DT): 153.3 MS
ECHO MV E VELOCITY: 1.05 M/S
ECHO MV E/A RATIO: 1.64
ECHO MV E/E' LATERAL: 10.5
ECHO MV E/E' RATIO (AVERAGED): 14
ECHO MV E/E' SEPTAL: 17.5
ECHO MV PRESSURE HALF TIME (PHT): 44.5 MS
ECHO PV MAX VELOCITY: 1.3 M/S
ECHO PV PEAK GRADIENT: 7 MMHG
ECHO RIGHT VENTRICULAR SYSTOLIC PRESSURE (RVSP): 40 MMHG
ECHO RV TAPSE: 1.9 CM (ref 1.7–?)
ECHO TV REGURGITANT MAX VELOCITY: 3.04 M/S
ECHO TV REGURGITANT PEAK GRADIENT: 37 MMHG
ERYTHROCYTE [DISTWIDTH] IN BLOOD BY AUTOMATED COUNT: 14.4 % (ref 11.5–14.5)
EST. AVERAGE GLUCOSE BLD GHB EST-MCNC: 131 MG/DL
GLOBULIN SER CALC-MCNC: 3.4 G/DL (ref 2–4)
GLOBULIN SER CALC-MCNC: 3.7 G/DL (ref 2–4)
GLUCOSE BLD STRIP.AUTO-MCNC: 127 MG/DL (ref 65–117)
GLUCOSE BLD STRIP.AUTO-MCNC: 128 MG/DL (ref 65–117)
GLUCOSE BLD STRIP.AUTO-MCNC: 137 MG/DL (ref 65–117)
GLUCOSE BLD STRIP.AUTO-MCNC: 143 MG/DL (ref 65–117)
GLUCOSE SERPL-MCNC: 128 MG/DL (ref 65–100)
GLUCOSE SERPL-MCNC: 138 MG/DL (ref 65–100)
GLUCOSE SERPL-MCNC: 139 MG/DL (ref 65–100)
GLUCOSE SERPL-MCNC: 150 MG/DL (ref 65–100)
HBA1C MFR BLD: 6.2 % (ref 4–5.6)
HCT VFR BLD AUTO: 33.8 % (ref 35–47)
HDLC SERPL-MCNC: 44 MG/DL
HDLC SERPL: 2.1 (ref 0–5)
HGB BLD-MCNC: 10.7 G/DL (ref 11.5–16)
LDLC SERPL CALC-MCNC: 36.6 MG/DL (ref 0–100)
MAGNESIUM SERPL-MCNC: 1.1 MG/DL (ref 1.6–2.4)
MAGNESIUM SERPL-MCNC: 2 MG/DL (ref 1.6–2.4)
MAGNESIUM SERPL-MCNC: 2.2 MG/DL (ref 1.6–2.4)
MAGNESIUM SERPL-MCNC: 2.5 MG/DL (ref 1.6–2.4)
MCH RBC QN AUTO: 28.2 PG (ref 26–34)
MCHC RBC AUTO-ENTMCNC: 31.7 G/DL (ref 30–36.5)
MCV RBC AUTO: 89.2 FL (ref 80–99)
NRBC # BLD: 0 K/UL (ref 0–0.01)
NRBC BLD-RTO: 0 PER 100 WBC
PHOSPHATE SERPL-MCNC: 2 MG/DL (ref 2.6–4.7)
PHOSPHATE SERPL-MCNC: 3 MG/DL (ref 2.6–4.7)
PHOSPHATE SERPL-MCNC: 3 MG/DL (ref 2.6–4.7)
PLATELET # BLD AUTO: 338 K/UL (ref 150–400)
PMV BLD AUTO: 8.9 FL (ref 8.9–12.9)
POTASSIUM SERPL-SCNC: 3.2 MMOL/L (ref 3.5–5.1)
POTASSIUM SERPL-SCNC: 3.2 MMOL/L (ref 3.5–5.1)
POTASSIUM SERPL-SCNC: 3.4 MMOL/L (ref 3.5–5.1)
POTASSIUM SERPL-SCNC: 3.9 MMOL/L (ref 3.5–5.1)
PROT SERPL-MCNC: 6.4 G/DL (ref 6.4–8.2)
PROT SERPL-MCNC: 6.7 G/DL (ref 6.4–8.2)
RBC # BLD AUTO: 3.79 M/UL (ref 3.8–5.2)
SERVICE CMNT-IMP: ABNORMAL
SODIUM SERPL-SCNC: 139 MMOL/L (ref 136–145)
SODIUM SERPL-SCNC: 139 MMOL/L (ref 136–145)
SODIUM SERPL-SCNC: 140 MMOL/L (ref 136–145)
SODIUM SERPL-SCNC: 141 MMOL/L (ref 136–145)
SODIUM SERPL-SCNC: 141 MMOL/L (ref 136–145)
SODIUM SERPL-SCNC: 142 MMOL/L (ref 136–145)
SODIUM SERPL-SCNC: 142 MMOL/L (ref 136–145)
SODIUM SERPL-SCNC: 143 MMOL/L (ref 136–145)
TRIGL SERPL-MCNC: 62 MG/DL
VLDLC SERPL CALC-MCNC: 12.4 MG/DL
WBC # BLD AUTO: 5.6 K/UL (ref 3.6–11)

## 2023-05-22 PROCEDURE — 93306 TTE W/DOPPLER COMPLETE: CPT

## 2023-05-22 PROCEDURE — 6360000002 HC RX W HCPCS: Performed by: NURSE PRACTITIONER

## 2023-05-22 PROCEDURE — 76937 US GUIDE VASCULAR ACCESS: CPT

## 2023-05-22 PROCEDURE — 85027 COMPLETE CBC AUTOMATED: CPT

## 2023-05-22 PROCEDURE — 80061 LIPID PANEL: CPT

## 2023-05-22 PROCEDURE — 97535 SELF CARE MNGMENT TRAINING: CPT

## 2023-05-22 PROCEDURE — A4216 STERILE WATER/SALINE, 10 ML: HCPCS | Performed by: NURSE PRACTITIONER

## 2023-05-22 PROCEDURE — 99223 1ST HOSP IP/OBS HIGH 75: CPT | Performed by: PSYCHIATRY & NEUROLOGY

## 2023-05-22 PROCEDURE — 84100 ASSAY OF PHOSPHORUS: CPT

## 2023-05-22 PROCEDURE — 02HV33Z INSERTION OF INFUSION DEVICE INTO SUPERIOR VENA CAVA, PERCUTANEOUS APPROACH: ICD-10-PCS | Performed by: EMERGENCY MEDICINE

## 2023-05-22 PROCEDURE — 97162 PT EVAL MOD COMPLEX 30 MIN: CPT

## 2023-05-22 PROCEDURE — 70450 CT HEAD/BRAIN W/O DYE: CPT

## 2023-05-22 PROCEDURE — 2700000000 HC OXYGEN THERAPY PER DAY

## 2023-05-22 PROCEDURE — 2580000003 HC RX 258

## 2023-05-22 PROCEDURE — 93306 TTE W/DOPPLER COMPLETE: CPT | Performed by: INTERNAL MEDICINE

## 2023-05-22 PROCEDURE — C9113 INJ PANTOPRAZOLE SODIUM, VIA: HCPCS | Performed by: NURSE PRACTITIONER

## 2023-05-22 PROCEDURE — 82962 GLUCOSE BLOOD TEST: CPT

## 2023-05-22 PROCEDURE — 2580000003 HC RX 258: Performed by: NURSE PRACTITIONER

## 2023-05-22 PROCEDURE — 84295 ASSAY OF SERUM SODIUM: CPT

## 2023-05-22 PROCEDURE — 2709999900 HC NON-CHARGEABLE SUPPLY

## 2023-05-22 PROCEDURE — 36415 COLL VENOUS BLD VENIPUNCTURE: CPT

## 2023-05-22 PROCEDURE — 2500000003 HC RX 250 WO HCPCS

## 2023-05-22 PROCEDURE — 6360000002 HC RX W HCPCS

## 2023-05-22 PROCEDURE — 2500000003 HC RX 250 WO HCPCS: Performed by: NURSE PRACTITIONER

## 2023-05-22 PROCEDURE — 97166 OT EVAL MOD COMPLEX 45 MIN: CPT

## 2023-05-22 PROCEDURE — APPNB30 APP NON BILLABLE TIME 0-30 MINS

## 2023-05-22 PROCEDURE — 6370000000 HC RX 637 (ALT 250 FOR IP): Performed by: NURSE PRACTITIONER

## 2023-05-22 PROCEDURE — 80053 COMPREHEN METABOLIC PANEL: CPT

## 2023-05-22 PROCEDURE — 92523 SPEECH SOUND LANG COMPREHEN: CPT

## 2023-05-22 PROCEDURE — 92610 EVALUATE SWALLOWING FUNCTION: CPT

## 2023-05-22 PROCEDURE — 97530 THERAPEUTIC ACTIVITIES: CPT

## 2023-05-22 PROCEDURE — 2000000000 HC ICU R&B

## 2023-05-22 PROCEDURE — 83735 ASSAY OF MAGNESIUM: CPT

## 2023-05-22 PROCEDURE — 83036 HEMOGLOBIN GLYCOSYLATED A1C: CPT

## 2023-05-22 PROCEDURE — C1751 CATH, INF, PER/CENT/MIDLINE: HCPCS

## 2023-05-22 RX ORDER — MAGNESIUM SULFATE IN WATER 40 MG/ML
2000 INJECTION, SOLUTION INTRAVENOUS ONCE
Status: COMPLETED | OUTPATIENT
Start: 2023-05-22 | End: 2023-05-22

## 2023-05-22 RX ORDER — SODIUM CHLORIDE 0.9 % (FLUSH) 0.9 %
5-40 SYRINGE (ML) INJECTION PRN
Status: DISCONTINUED | OUTPATIENT
Start: 2023-05-22 | End: 2023-05-27 | Stop reason: HOSPADM

## 2023-05-22 RX ORDER — DILTIAZEM HYDROCHLORIDE 5 MG/ML
5 INJECTION INTRAVENOUS ONCE
Status: COMPLETED | OUTPATIENT
Start: 2023-05-22 | End: 2023-05-22

## 2023-05-22 RX ORDER — SODIUM CHLORIDE 9 MG/ML
INJECTION, SOLUTION INTRAVENOUS PRN
Status: DISCONTINUED | OUTPATIENT
Start: 2023-05-22 | End: 2023-05-27 | Stop reason: HOSPADM

## 2023-05-22 RX ORDER — POTASSIUM CHLORIDE 7.45 MG/ML
10 INJECTION INTRAVENOUS
Status: COMPLETED | OUTPATIENT
Start: 2023-05-22 | End: 2023-05-22

## 2023-05-22 RX ORDER — MAGNESIUM SULFATE 1 G/100ML
1000 INJECTION INTRAVENOUS ONCE
Status: COMPLETED | OUTPATIENT
Start: 2023-05-22 | End: 2023-05-22

## 2023-05-22 RX ORDER — SODIUM CHLORIDE 0.9 % (FLUSH) 0.9 %
5-40 SYRINGE (ML) INJECTION EVERY 12 HOURS SCHEDULED
Status: DISCONTINUED | OUTPATIENT
Start: 2023-05-22 | End: 2023-05-27 | Stop reason: HOSPADM

## 2023-05-22 RX ADMIN — POTASSIUM PHOSPHATE, MONOBASIC AND POTASSIUM PHOSPHATE, DIBASIC 15 MMOL: 224; 236 INJECTION, SOLUTION, CONCENTRATE INTRAVENOUS at 19:23

## 2023-05-22 RX ADMIN — LEVETIRACETAM 500 MG: 100 INJECTION, SOLUTION INTRAVENOUS at 15:03

## 2023-05-22 RX ADMIN — ACETAMINOPHEN 650 MG: 325 TABLET, FILM COATED ORAL at 16:06

## 2023-05-22 RX ADMIN — SODIUM CHLORIDE, PRESERVATIVE FREE 10 ML: 5 INJECTION INTRAVENOUS at 10:15

## 2023-05-22 RX ADMIN — SODIUM CHLORIDE 5 MG/HR: 9 INJECTION, SOLUTION INTRAVENOUS at 12:22

## 2023-05-22 RX ADMIN — PANTOPRAZOLE SODIUM 40 MG: 40 INJECTION, POWDER, FOR SOLUTION INTRAVENOUS at 10:15

## 2023-05-22 RX ADMIN — SODIUM CHLORIDE 5 MG/HR: 9 INJECTION, SOLUTION INTRAVENOUS at 07:57

## 2023-05-22 RX ADMIN — POTASSIUM CHLORIDE 10 MEQ: 7.46 INJECTION, SOLUTION INTRAVENOUS at 05:04

## 2023-05-22 RX ADMIN — POTASSIUM CHLORIDE 10 MEQ: 7.46 INJECTION, SOLUTION INTRAVENOUS at 03:52

## 2023-05-22 RX ADMIN — SODIUM CHLORIDE 50 ML/HR: 3 INJECTION, SOLUTION INTRAVENOUS at 21:00

## 2023-05-22 RX ADMIN — SODIUM CHLORIDE 5 MG/HR: 9 INJECTION, SOLUTION INTRAVENOUS at 13:34

## 2023-05-22 RX ADMIN — SODIUM CHLORIDE 5 MG/HR: 9 INJECTION, SOLUTION INTRAVENOUS at 18:40

## 2023-05-22 RX ADMIN — MAGNESIUM SULFATE HEPTAHYDRATE 1000 MG: 1 INJECTION, SOLUTION INTRAVENOUS at 18:25

## 2023-05-22 RX ADMIN — POTASSIUM CHLORIDE 10 MEQ: 7.46 INJECTION, SOLUTION INTRAVENOUS at 02:46

## 2023-05-22 RX ADMIN — SODIUM CHLORIDE, PRESERVATIVE FREE 10 ML: 5 INJECTION INTRAVENOUS at 19:23

## 2023-05-22 RX ADMIN — MAGNESIUM SULFATE HEPTAHYDRATE 2000 MG: 40 INJECTION, SOLUTION INTRAVENOUS at 00:21

## 2023-05-22 RX ADMIN — LEVETIRACETAM 500 MG: 100 INJECTION, SOLUTION INTRAVENOUS at 02:46

## 2023-05-22 RX ADMIN — SODIUM CHLORIDE 50 ML/HR: 3 INJECTION, SOLUTION INTRAVENOUS at 10:59

## 2023-05-22 RX ADMIN — POTASSIUM CHLORIDE 10 MEQ: 7.46 INJECTION, SOLUTION INTRAVENOUS at 06:06

## 2023-05-22 RX ADMIN — SODIUM CHLORIDE 5 MG/HR: 9 INJECTION, SOLUTION INTRAVENOUS at 01:23

## 2023-05-22 RX ADMIN — DILTIAZEM HYDROCHLORIDE 5 MG: 5 INJECTION INTRAVENOUS at 17:28

## 2023-05-22 RX ADMIN — DILTIAZEM HYDROCHLORIDE 5 MG/HR: 5 INJECTION INTRAVENOUS at 17:50

## 2023-05-22 ASSESSMENT — PAIN SCALES - GENERAL
PAINLEVEL_OUTOF10: 0

## 2023-05-22 NOTE — PROCEDURES
Ultrasound-guided bedside PICC placement with Apptentive Sherlock 3CG TPS    Consent obtained after risks of  possible DVT, air embolism, infection, arterial puncture and catheter malposition are explained and all questions answered. PRE-PROCEDURE VERIFICATION  Correct Procedure: yes  Correct Site: yes  Temperature: Temp: 98.2 °F (36.8 °C), Temperature Source:    Recent Labs     05/21/23  1224 05/22/23  0014 05/22/23  0315   BUN 10   < > 8   PLT  --   --  338   INR 1.5*  --   --    WBC  --   --  5.6    < > = values in this interval not displayed. Allergies: Patient has no known allergies. Education materials, including PICC Booklet, for PICC Care given to patient: yes. See Patient Education activity for further details. PROCEDURE DETAIL  PICC placed using modified Seldinger method. A double lumen PICC line was started for desire for reliable access. The following documentation is in addition to the PICC properties in the lines/airways flowsheet :  Lot #: [id number]  Was xylocaine 1% used intradermally: yes  Catheter to vein ratio: 30%  Arm Circumference 10 cm above AC: 36 (cm)  Total Catheter Length: 43 (cm)  External Catheter Length: 1 (cm)  Vein Selection for PICC: left basilic  Central Line Bundle followed: yes  Complication Related to Insertion: none    The placement was verified by ECG  technology: The tip location is on the left side and the tip is in the distal superior vena cava. See ECG results for PICC tip placement. Report given to nurse. Line is okay to use.     Stacy Ojeda RN

## 2023-05-22 NOTE — ANESTHESIA POSTPROCEDURE EVALUATION
Department of Anesthesiology  Postprocedure Note    Patient: Herberth Johns  MRN: [id number]  YOB: 1945      Procedure Summary     Date: 05/15/23 Room / Location: [facility] EP LAB / Kevin Woods    Anesthesia Start: 0913 Anesthesia Stop: 0835    Procedures:       Ablation A-fib w complete ep study      Ultrasound guided vascular access      Ep 3d mapping      Drug stimulation      Ablation following A-fib addl      Intracardiac echocardiogram Diagnosis:       Paroxysmal A-fib ([address]      (Same)    Providers: Rolo Hassan MD Responsible Provider: Cheryle Fitz., MD    Anesthesia Type: General ASA Status: 3          Anesthesia Type: General    Balaji Phase I: Balaji Score: 9    Balaji Phase II: Balaji Score: 10      Anesthesia Post Evaluation    Patient location during evaluation: PACU  Patient participation: complete - patient cannot participate  Level of consciousness: awake  Pain score: 0  Airway patency: patent  Nausea & Vomiting: no nausea and no vomiting  Complications: no  Cardiovascular status: hemodynamically stable  Respiratory status: acceptable  Hydration status: stable  Multimodal analgesia pain management approach

## 2023-05-22 NOTE — CARE COORDINATION
Reason for Readmission:  Stroke            RUR Score/Risk Level:   19%    PCP: First and Last name:  Shantal Mendes   Name of Practice:    Are you a current patient: Yes/No: Yes   Approximate date of last visit:  2 weeks ago   Can you participate in a virtual visit with your PCP: No    Is a Care Conference indicated: No      Did you attend your follow up Yes appointment (s): If not, why not:         Resources/supports as identified by patient/family:   Son Patsy Hadley 46 facing patient (as identified by patient/family and CM): Finances/Medication cost?   None voiced    Transportation    Son     Support system or lack thereof? Son     Living arrangements? Patient's sister and patient's grandson live  together      Self-care/ADLs/Cognition? A&), Independent         Current Advanced Directive/Advance Care Plan: On file           Plan for utilizing home health:   TBD             Transition of Care Plan:    Based on readmission, the patient's previous Plan of Care   has been evaluated and/or modified. The current Transition of Care Plan is:       Patient admitted with a IPH. Care manager met with patient's son to introduce self and explain role. Patient was independent to include driving prior to admission. Patient has no previous HH, DME or IPR needs. Patient receives Social Security and a pension. CM confirmed demographic information. Will follow for transitions of care.    Dilcia Marlow RN,Care Management  Readmission Assessment  Number of Days since last admission?: 8-30 days (DOS 5/6/23-5/11/23)  Previous Disposition: Home with Family  Who is being Interviewed: Caregiver (Son Rivera bey)  What was the patient's/caregiver's perception as to why they think they needed to return back to the hospital?:  (Stroke)  Did you visit your Primary Care Physician after you left the hospital, before you returned this time?: Yes  Did you see a specialist, such as Cardiac, Pulmonary, Orthopedic

## 2023-05-23 LAB
ANION GAP SERPL CALC-SCNC: 5 MMOL/L (ref 5–15)
BUN SERPL-MCNC: 9 MG/DL (ref 6–20)
BUN/CREAT SERPL: 18 (ref 12–20)
CALCIUM SERPL-MCNC: 7.6 MG/DL (ref 8.5–10.1)
CHLORIDE SERPL-SCNC: 114 MMOL/L (ref 97–108)
CO2 SERPL-SCNC: 25 MMOL/L (ref 21–32)
CREAT SERPL-MCNC: 0.49 MG/DL (ref 0.55–1.02)
ERYTHROCYTE [DISTWIDTH] IN BLOOD BY AUTOMATED COUNT: 15.3 % (ref 11.5–14.5)
GLUCOSE BLD STRIP.AUTO-MCNC: 131 MG/DL (ref 65–117)
GLUCOSE BLD STRIP.AUTO-MCNC: 139 MG/DL (ref 65–117)
GLUCOSE BLD STRIP.AUTO-MCNC: 142 MG/DL (ref 65–117)
GLUCOSE BLD STRIP.AUTO-MCNC: 163 MG/DL (ref 65–117)
GLUCOSE SERPL-MCNC: 154 MG/DL (ref 65–100)
HCT VFR BLD AUTO: 32.6 % (ref 35–47)
HGB BLD-MCNC: 9.8 G/DL (ref 11.5–16)
MAGNESIUM SERPL-MCNC: 2.3 MG/DL (ref 1.6–2.4)
MCH RBC QN AUTO: 27.8 PG (ref 26–34)
MCHC RBC AUTO-ENTMCNC: 30.1 G/DL (ref 30–36.5)
MCV RBC AUTO: 92.4 FL (ref 80–99)
NRBC # BLD: 0 K/UL (ref 0–0.01)
NRBC BLD-RTO: 0 PER 100 WBC
PHOSPHATE SERPL-MCNC: 2.1 MG/DL (ref 2.6–4.7)
PLATELET # BLD AUTO: 296 K/UL (ref 150–400)
PMV BLD AUTO: 8.6 FL (ref 8.9–12.9)
POTASSIUM SERPL-SCNC: 3.7 MMOL/L (ref 3.5–5.1)
RBC # BLD AUTO: 3.53 M/UL (ref 3.8–5.2)
SERVICE CMNT-IMP: ABNORMAL
SODIUM SERPL-SCNC: 144 MMOL/L (ref 136–145)
SODIUM SERPL-SCNC: 145 MMOL/L (ref 136–145)
WBC # BLD AUTO: 6.1 K/UL (ref 3.6–11)

## 2023-05-23 PROCEDURE — 2580000003 HC RX 258

## 2023-05-23 PROCEDURE — 99222 1ST HOSP IP/OBS MODERATE 55: CPT | Performed by: INTERNAL MEDICINE

## 2023-05-23 PROCEDURE — 80048 BASIC METABOLIC PNL TOTAL CA: CPT

## 2023-05-23 PROCEDURE — C9113 INJ PANTOPRAZOLE SODIUM, VIA: HCPCS | Performed by: NURSE PRACTITIONER

## 2023-05-23 PROCEDURE — 36415 COLL VENOUS BLD VENIPUNCTURE: CPT

## 2023-05-23 PROCEDURE — 6370000000 HC RX 637 (ALT 250 FOR IP): Performed by: NURSE PRACTITIONER

## 2023-05-23 PROCEDURE — 99233 SBSQ HOSP IP/OBS HIGH 50: CPT | Performed by: PSYCHIATRY & NEUROLOGY

## 2023-05-23 PROCEDURE — 2500000003 HC RX 250 WO HCPCS: Performed by: NURSE PRACTITIONER

## 2023-05-23 PROCEDURE — 85027 COMPLETE CBC AUTOMATED: CPT

## 2023-05-23 PROCEDURE — 84100 ASSAY OF PHOSPHORUS: CPT

## 2023-05-23 PROCEDURE — 97530 THERAPEUTIC ACTIVITIES: CPT

## 2023-05-23 PROCEDURE — 6360000002 HC RX W HCPCS: Performed by: NURSE PRACTITIONER

## 2023-05-23 PROCEDURE — 92526 ORAL FUNCTION THERAPY: CPT

## 2023-05-23 PROCEDURE — 94761 N-INVAS EAR/PLS OXIMETRY MLT: CPT

## 2023-05-23 PROCEDURE — 6360000002 HC RX W HCPCS

## 2023-05-23 PROCEDURE — 6370000000 HC RX 637 (ALT 250 FOR IP)

## 2023-05-23 PROCEDURE — 97110 THERAPEUTIC EXERCISES: CPT

## 2023-05-23 PROCEDURE — 2580000003 HC RX 258: Performed by: NURSE PRACTITIONER

## 2023-05-23 PROCEDURE — 97535 SELF CARE MNGMENT TRAINING: CPT

## 2023-05-23 PROCEDURE — 92507 TX SP LANG VOICE COMM INDIV: CPT

## 2023-05-23 PROCEDURE — 84295 ASSAY OF SERUM SODIUM: CPT

## 2023-05-23 PROCEDURE — 83735 ASSAY OF MAGNESIUM: CPT

## 2023-05-23 PROCEDURE — 2060000000 HC ICU INTERMEDIATE R&B

## 2023-05-23 PROCEDURE — 82962 GLUCOSE BLOOD TEST: CPT

## 2023-05-23 PROCEDURE — A4216 STERILE WATER/SALINE, 10 ML: HCPCS | Performed by: NURSE PRACTITIONER

## 2023-05-23 RX ORDER — LANSOPRAZOLE 30 MG/1
30 TABLET, ORALLY DISINTEGRATING, DELAYED RELEASE ORAL
Status: DISCONTINUED | OUTPATIENT
Start: 2023-05-24 | End: 2023-05-27 | Stop reason: HOSPADM

## 2023-05-23 RX ORDER — IPRATROPIUM BROMIDE AND ALBUTEROL SULFATE 2.5; .5 MG/3ML; MG/3ML
1 SOLUTION RESPIRATORY (INHALATION) EVERY 4 HOURS PRN
Status: DISCONTINUED | OUTPATIENT
Start: 2023-05-23 | End: 2023-05-27 | Stop reason: HOSPADM

## 2023-05-23 RX ADMIN — AMIODARONE HYDROCHLORIDE 0.5 MG/MIN: 50 INJECTION, SOLUTION INTRAVENOUS at 17:50

## 2023-05-23 RX ADMIN — AMIODARONE HYDROCHLORIDE 0.5 MG/MIN: 50 INJECTION, SOLUTION INTRAVENOUS at 19:17

## 2023-05-23 RX ADMIN — SODIUM CHLORIDE, PRESERVATIVE FREE 10 ML: 5 INJECTION INTRAVENOUS at 21:23

## 2023-05-23 RX ADMIN — SODIUM CHLORIDE, PRESERVATIVE FREE 10 ML: 5 INJECTION INTRAVENOUS at 08:47

## 2023-05-23 RX ADMIN — LEVETIRACETAM 500 MG: 100 INJECTION, SOLUTION INTRAVENOUS at 04:43

## 2023-05-23 RX ADMIN — ACETAMINOPHEN 650 MG: 325 TABLET, FILM COATED ORAL at 06:43

## 2023-05-23 RX ADMIN — AMIODARONE HYDROCHLORIDE 1 MG/MIN: 50 INJECTION, SOLUTION INTRAVENOUS at 11:55

## 2023-05-23 RX ADMIN — LEVOTHYROXINE SODIUM 50 MCG: 0.05 TABLET ORAL at 06:43

## 2023-05-23 RX ADMIN — SODIUM CHLORIDE 50 ML/HR: 3 INJECTION, SOLUTION INTRAVENOUS at 07:44

## 2023-05-23 RX ADMIN — LEVETIRACETAM 500 MG: 100 INJECTION, SOLUTION INTRAVENOUS at 14:53

## 2023-05-23 RX ADMIN — ATORVASTATIN CALCIUM 20 MG: 20 TABLET, FILM COATED ORAL at 08:46

## 2023-05-23 RX ADMIN — IPRATROPIUM BROMIDE AND ALBUTEROL SULFATE 1 AMPULE: .5; 3 SOLUTION RESPIRATORY (INHALATION) at 21:22

## 2023-05-23 RX ADMIN — HYDRALAZINE HYDROCHLORIDE 10 MG: 20 INJECTION INTRAMUSCULAR; INTRAVENOUS at 20:11

## 2023-05-23 RX ADMIN — PANTOPRAZOLE SODIUM 40 MG: 40 INJECTION, POWDER, FOR SOLUTION INTRAVENOUS at 08:46

## 2023-05-23 RX ADMIN — AMIODARONE HYDROCHLORIDE 150 MG: 50 INJECTION, SOLUTION INTRAVENOUS at 11:35

## 2023-05-23 RX ADMIN — POTASSIUM PHOSPHATE, MONOBASIC AND POTASSIUM PHOSPHATE, DIBASIC 15 MMOL: 224; 236 INJECTION, SOLUTION, CONCENTRATE INTRAVENOUS at 11:35

## 2023-05-23 ASSESSMENT — PAIN SCALES - GENERAL: PAINLEVEL_OUTOF10: 0

## 2023-05-23 NOTE — H&P
History and Physical    Date of Service:  5/23/2023  Primary Care Provider: Fabienne Penny MD  Source of information: Patient, family at bedside, chart review    Chief Complaint: Extremity Weakness      History of Presenting Illness:   Juan Lowe is a 65 y.o. female with a PMHx of A-fib on eliquis, T2DM, GERD, HLD, HTN, hypokalemia and hypothyroidism. She presented to the ED as a transfer from [facility] after [address] found on CT with right-sided paralysis. Neurosurgery reviewed imaging and determined that  no intervention was recommended given the patient was on eliquis. Neurology following. Patient experienced some A-fib with RVR while in the ICU and was rate controlled with diltiazem gtt. Cardiology consulted and the patient was transitioned to Amiodarone bolus and gtt as patient was previously on PO amiodarone. Hospitalist consulted for transfer out of the ICU (5/23/23). Today the patient is tired but has no acute complaints, she is still experiencing right sided hemiparesis. Per family at baseline she was fully ambulatory and independent at baseline. The patient denies any headache, blurry vision, sore throat, trouble swallowing, trouble with speech, chest pain, SOB, cough, fever, chills, N/V/D, abd pain, urinary symptoms, constipation, recent travels, sick contacts, falls, injuries, rashes, contact with COVID-19 diagnosed patients, hematemesis, melena, hemoptysis, hematuria, rashes, denies starting any new medications and denies any other concerns or problems besides as mentioned above. REVIEW OF SYSTEMS:  A comprehensive review of systems was negative except for that written in the History of Present Illness.      Past Medical History:   Diagnosis Date    Atrial fibrillation (HCC)     Diabetes ([facility] [address]     GERD (gastroesophageal reflux disease)     High cholesterol     Hypertension     Hypokalemia     Thyroid disease       Past Surgical History:   Procedure Laterality Date    CARDIAC

## 2023-05-23 NOTE — CARE COORDINATION
Transition of Care Plan:    RUR: 22 % High  Prior Level of Functioning:   Disposition: IPR  IPR: Date FOC offered: 5/23/23  Date [address] received: 5/23/23  Accepting facility: [facility]  Date authorization started with reference number: NA  Transportation at discharge: Stretcher  [facility] Medicare 5/21/23  Caregiver Contact: Jim FUENTES Critical access hospital needed? No  Barriers to discharge:    Therapy is recommending IPR. Provided son with the list of facilities. Will follow up for choice once he speaks with family.   Jovi Sawyer RN,Care Management

## 2023-05-23 NOTE — CONSULTS
Bilateral     carpal tunnel    ORTHOPEDIC SURGERY Bilateral     pt stated toe surgery; hammertoe    OTHER SURGICAL HISTORY      liver cyst drained    TOTAL KNEE ARTHROPLASTY Bilateral     UROLOGICAL SURGERY      Bladder prolapse surgery         Social History     Socioeconomic History    Marital status:       Spouse name: Not on file    Number of children: Not on file    Years of education: Not on file    Highest education level: Not on file   Occupational History    Not on file   Tobacco Use    Smoking status: Never    Smokeless tobacco: Never   Vaping Use    Vaping Use: Never used   Substance and Sexual Activity    Alcohol use: Not Currently    Drug use: Not on file    Sexual activity: Not on file   Other Topics Concern    Not on file   Social History Narrative    Not on file     Social Determinants of Health     Financial Resource Strain: Not on file   Food Insecurity: Not on file   Transportation Needs: Not on file   Physical Activity: Not on file   Stress: Not on file   Social Connections: Not on file   Intimate Partner Violence: Not on file   Housing Stability: Not on file          Family History   Problem Relation Age of Onset    COPD Father     Cancer Mother         ovarian    Cancer Sister         breast                        [place] Session, ANP      [facility] [address] [facility] [address]  Alexus Garg  [phone]      Kole Shin MD

## 2023-05-24 ENCOUNTER — APPOINTMENT (OUTPATIENT)
Facility: HOSPITAL | Age: 78
End: 2023-05-24
Payer: MEDICARE

## 2023-05-24 LAB
ANION GAP SERPL CALC-SCNC: 6 MMOL/L (ref 5–15)
BUN SERPL-MCNC: 10 MG/DL (ref 6–20)
BUN/CREAT SERPL: 20 (ref 12–20)
CALCIUM SERPL-MCNC: 7.8 MG/DL (ref 8.5–10.1)
CHLORIDE SERPL-SCNC: 108 MMOL/L (ref 97–108)
CO2 SERPL-SCNC: 26 MMOL/L (ref 21–32)
CREAT SERPL-MCNC: 0.49 MG/DL (ref 0.55–1.02)
EKG ATRIAL RATE: 105 BPM
EKG DIAGNOSIS: NORMAL
EKG Q-T INTERVAL: 310 MS
EKG QRS DURATION: 68 MS
EKG QTC CALCULATION (BAZETT): 473 MS
EKG R AXIS: -2 DEGREES
EKG T AXIS: 222 DEGREES
EKG VENTRICULAR RATE: 140 BPM
ERYTHROCYTE [DISTWIDTH] IN BLOOD BY AUTOMATED COUNT: 15.2 % (ref 11.5–14.5)
GLUCOSE BLD STRIP.AUTO-MCNC: 117 MG/DL (ref 65–117)
GLUCOSE BLD STRIP.AUTO-MCNC: 119 MG/DL (ref 65–117)
GLUCOSE BLD STRIP.AUTO-MCNC: 126 MG/DL (ref 65–117)
GLUCOSE BLD STRIP.AUTO-MCNC: 130 MG/DL (ref 65–100)
GLUCOSE SERPL-MCNC: 127 MG/DL (ref 65–100)
HCT VFR BLD AUTO: 32.5 % (ref 35–47)
HGB BLD-MCNC: 10 G/DL (ref 11.5–16)
MAGNESIUM SERPL-MCNC: 2 MG/DL (ref 1.6–2.4)
MCH RBC QN AUTO: 28.6 PG (ref 26–34)
MCHC RBC AUTO-ENTMCNC: 30.8 G/DL (ref 30–36.5)
MCV RBC AUTO: 92.9 FL (ref 80–99)
NRBC # BLD: 0 K/UL (ref 0–0.01)
NRBC BLD-RTO: 0 PER 100 WBC
PERFORMED BY:: ABNORMAL
PHOSPHATE SERPL-MCNC: 2 MG/DL (ref 2.6–4.7)
PLATELET # BLD AUTO: 300 K/UL (ref 150–400)
PMV BLD AUTO: 8.5 FL (ref 8.9–12.9)
POTASSIUM SERPL-SCNC: 3.6 MMOL/L (ref 3.5–5.1)
PROCALCITONIN SERPL-MCNC: <0.05 NG/ML
RBC # BLD AUTO: 3.5 M/UL (ref 3.8–5.2)
SERVICE CMNT-IMP: ABNORMAL
SERVICE CMNT-IMP: ABNORMAL
SERVICE CMNT-IMP: NORMAL
SODIUM SERPL-SCNC: 140 MMOL/L (ref 136–145)
WBC # BLD AUTO: 7.6 K/UL (ref 3.6–11)

## 2023-05-24 PROCEDURE — 2500000003 HC RX 250 WO HCPCS

## 2023-05-24 PROCEDURE — 83735 ASSAY OF MAGNESIUM: CPT

## 2023-05-24 PROCEDURE — 6360000002 HC RX W HCPCS: Performed by: NURSE PRACTITIONER

## 2023-05-24 PROCEDURE — 2060000000 HC ICU INTERMEDIATE R&B

## 2023-05-24 PROCEDURE — 84145 PROCALCITONIN (PCT): CPT

## 2023-05-24 PROCEDURE — 99232 SBSQ HOSP IP/OBS MODERATE 35: CPT | Performed by: INTERNAL MEDICINE

## 2023-05-24 PROCEDURE — 97535 SELF CARE MNGMENT TRAINING: CPT

## 2023-05-24 PROCEDURE — 2580000003 HC RX 258: Performed by: NURSE PRACTITIONER

## 2023-05-24 PROCEDURE — 36415 COLL VENOUS BLD VENIPUNCTURE: CPT

## 2023-05-24 PROCEDURE — 82962 GLUCOSE BLOOD TEST: CPT

## 2023-05-24 PROCEDURE — 97112 NEUROMUSCULAR REEDUCATION: CPT

## 2023-05-24 PROCEDURE — 99233 SBSQ HOSP IP/OBS HIGH 50: CPT | Performed by: PSYCHIATRY & NEUROLOGY

## 2023-05-24 PROCEDURE — 84100 ASSAY OF PHOSPHORUS: CPT

## 2023-05-24 PROCEDURE — 92507 TX SP LANG VOICE COMM INDIV: CPT | Performed by: SPEECH-LANGUAGE PATHOLOGIST

## 2023-05-24 PROCEDURE — 97530 THERAPEUTIC ACTIVITIES: CPT

## 2023-05-24 PROCEDURE — 6370000000 HC RX 637 (ALT 250 FOR IP): Performed by: INTERNAL MEDICINE

## 2023-05-24 PROCEDURE — 6360000002 HC RX W HCPCS

## 2023-05-24 PROCEDURE — 85027 COMPLETE CBC AUTOMATED: CPT

## 2023-05-24 PROCEDURE — 70450 CT HEAD/BRAIN W/O DYE: CPT

## 2023-05-24 PROCEDURE — 80048 BASIC METABOLIC PNL TOTAL CA: CPT

## 2023-05-24 PROCEDURE — 71045 X-RAY EXAM CHEST 1 VIEW: CPT

## 2023-05-24 PROCEDURE — 6370000000 HC RX 637 (ALT 250 FOR IP): Performed by: NURSE PRACTITIONER

## 2023-05-24 RX ORDER — AMIODARONE HYDROCHLORIDE 200 MG/1
200 TABLET ORAL DAILY
Status: DISCONTINUED | OUTPATIENT
Start: 2023-05-24 | End: 2023-05-25

## 2023-05-24 RX ORDER — AMLODIPINE BESYLATE 5 MG/1
2.5 TABLET ORAL DAILY
Status: DISCONTINUED | OUTPATIENT
Start: 2023-05-24 | End: 2023-05-25

## 2023-05-24 RX ORDER — LEVETIRACETAM 500 MG/1
500 TABLET ORAL 2 TIMES DAILY
Status: DISCONTINUED | OUTPATIENT
Start: 2023-05-24 | End: 2023-05-27 | Stop reason: HOSPADM

## 2023-05-24 RX ORDER — LIDOCAINE 4 G/G
1 PATCH TOPICAL DAILY
Status: DISCONTINUED | OUTPATIENT
Start: 2023-05-24 | End: 2023-05-27 | Stop reason: HOSPADM

## 2023-05-24 RX ORDER — MORPHINE SULFATE 2 MG/ML
1 INJECTION, SOLUTION INTRAMUSCULAR; INTRAVENOUS ONCE
Status: COMPLETED | OUTPATIENT
Start: 2023-05-24 | End: 2023-05-24

## 2023-05-24 RX ADMIN — LEVOTHYROXINE SODIUM 50 MCG: 0.05 TABLET ORAL at 06:27

## 2023-05-24 RX ADMIN — SODIUM CHLORIDE, PRESERVATIVE FREE 10 ML: 5 INJECTION INTRAVENOUS at 12:59

## 2023-05-24 RX ADMIN — ACETAMINOPHEN 650 MG: 325 TABLET, FILM COATED ORAL at 02:12

## 2023-05-24 RX ADMIN — LEVETIRACETAM 500 MG: 100 INJECTION, SOLUTION INTRAVENOUS at 02:12

## 2023-05-24 RX ADMIN — LABETALOL HYDROCHLORIDE 10 MG: 5 INJECTION INTRAVENOUS at 18:45

## 2023-05-24 RX ADMIN — AMIODARONE HYDROCHLORIDE 0.5 MG/MIN: 50 INJECTION, SOLUTION INTRAVENOUS at 09:31

## 2023-05-24 RX ADMIN — ATORVASTATIN CALCIUM 20 MG: 20 TABLET, FILM COATED ORAL at 09:35

## 2023-05-24 RX ADMIN — HYDRALAZINE HYDROCHLORIDE 10 MG: 20 INJECTION INTRAMUSCULAR; INTRAVENOUS at 02:30

## 2023-05-24 RX ADMIN — LABETALOL HYDROCHLORIDE 10 MG: 5 INJECTION INTRAVENOUS at 10:54

## 2023-05-24 RX ADMIN — MORPHINE SULFATE 1 MG: 2 INJECTION, SOLUTION INTRAMUSCULAR; INTRAVENOUS at 04:20

## 2023-05-24 RX ADMIN — LEVETIRACETAM 500 MG: 500 TABLET, FILM COATED ORAL at 18:44

## 2023-05-24 RX ADMIN — LANSOPRAZOLE 30 MG: 30 TABLET, ORALLY DISINTEGRATING, DELAYED RELEASE ORAL at 06:27

## 2023-05-24 RX ADMIN — HYDRALAZINE HYDROCHLORIDE 10 MG: 20 INJECTION INTRAMUSCULAR; INTRAVENOUS at 14:26

## 2023-05-24 RX ADMIN — AMLODIPINE BESYLATE 2.5 MG: 5 TABLET ORAL at 12:58

## 2023-05-24 RX ADMIN — AMIODARONE HYDROCHLORIDE 200 MG: 200 TABLET ORAL at 12:56

## 2023-05-24 RX ADMIN — SODIUM CHLORIDE, PRESERVATIVE FREE 10 ML: 5 INJECTION INTRAVENOUS at 22:06

## 2023-05-24 ASSESSMENT — PAIN SCALES - GENERAL
PAINLEVEL_OUTOF10: 0
PAINLEVEL_OUTOF10: 5
PAINLEVEL_OUTOF10: 3
PAINLEVEL_OUTOF10: 0
PAINLEVEL_OUTOF10: 7

## 2023-05-24 ASSESSMENT — PAIN DESCRIPTION - ORIENTATION
ORIENTATION: LEFT
ORIENTATION: LEFT

## 2023-05-24 ASSESSMENT — PAIN DESCRIPTION - DESCRIPTORS
DESCRIPTORS: ACHING
DESCRIPTORS: ACHING

## 2023-05-24 ASSESSMENT — PAIN DESCRIPTION - LOCATION
LOCATION: SHOULDER
LOCATION: ARM;LEG

## 2023-05-24 ASSESSMENT — PAIN DESCRIPTION - PAIN TYPE: TYPE: ACUTE PAIN

## 2023-05-25 LAB
ANION GAP SERPL CALC-SCNC: 5 MMOL/L (ref 5–15)
BUN SERPL-MCNC: 12 MG/DL (ref 6–20)
BUN/CREAT SERPL: 28 (ref 12–20)
CALCIUM SERPL-MCNC: 8.1 MG/DL (ref 8.5–10.1)
CHLORIDE SERPL-SCNC: 104 MMOL/L (ref 97–108)
CO2 SERPL-SCNC: 29 MMOL/L (ref 21–32)
CREAT SERPL-MCNC: 0.43 MG/DL (ref 0.55–1.02)
ERYTHROCYTE [DISTWIDTH] IN BLOOD BY AUTOMATED COUNT: 15.1 % (ref 11.5–14.5)
GLUCOSE BLD STRIP.AUTO-MCNC: 109 MG/DL (ref 65–117)
GLUCOSE BLD STRIP.AUTO-MCNC: 113 MG/DL (ref 65–117)
GLUCOSE BLD STRIP.AUTO-MCNC: 119 MG/DL (ref 65–117)
GLUCOSE BLD STRIP.AUTO-MCNC: 120 MG/DL (ref 65–117)
GLUCOSE BLD STRIP.AUTO-MCNC: 122 MG/DL (ref 65–117)
GLUCOSE BLD STRIP.AUTO-MCNC: 142 MG/DL (ref 65–117)
GLUCOSE SERPL-MCNC: 109 MG/DL (ref 65–100)
HCT VFR BLD AUTO: 30.9 % (ref 35–47)
HGB BLD-MCNC: 9.3 G/DL (ref 11.5–16)
MAGNESIUM SERPL-MCNC: 2.1 MG/DL (ref 1.6–2.4)
MCH RBC QN AUTO: 27.9 PG (ref 26–34)
MCHC RBC AUTO-ENTMCNC: 30.1 G/DL (ref 30–36.5)
MCV RBC AUTO: 92.8 FL (ref 80–99)
NRBC # BLD: 0 K/UL (ref 0–0.01)
NRBC BLD-RTO: 0 PER 100 WBC
PHOSPHATE SERPL-MCNC: 3.3 MG/DL (ref 2.6–4.7)
PLATELET # BLD AUTO: 247 K/UL (ref 150–400)
PMV BLD AUTO: 8.9 FL (ref 8.9–12.9)
POTASSIUM SERPL-SCNC: 3.7 MMOL/L (ref 3.5–5.1)
RBC # BLD AUTO: 3.33 M/UL (ref 3.8–5.2)
SERVICE CMNT-IMP: ABNORMAL
SERVICE CMNT-IMP: NORMAL
SERVICE CMNT-IMP: NORMAL
SODIUM SERPL-SCNC: 138 MMOL/L (ref 136–145)
WBC # BLD AUTO: 5.1 K/UL (ref 3.6–11)

## 2023-05-25 PROCEDURE — 97530 THERAPEUTIC ACTIVITIES: CPT

## 2023-05-25 PROCEDURE — 80048 BASIC METABOLIC PNL TOTAL CA: CPT

## 2023-05-25 PROCEDURE — 6370000000 HC RX 637 (ALT 250 FOR IP): Performed by: INTERNAL MEDICINE

## 2023-05-25 PROCEDURE — 2580000003 HC RX 258: Performed by: NURSE PRACTITIONER

## 2023-05-25 PROCEDURE — 97112 NEUROMUSCULAR REEDUCATION: CPT

## 2023-05-25 PROCEDURE — 6370000000 HC RX 637 (ALT 250 FOR IP): Performed by: NURSE PRACTITIONER

## 2023-05-25 PROCEDURE — 36415 COLL VENOUS BLD VENIPUNCTURE: CPT

## 2023-05-25 PROCEDURE — 85027 COMPLETE CBC AUTOMATED: CPT

## 2023-05-25 PROCEDURE — 2500000003 HC RX 250 WO HCPCS: Performed by: NURSE PRACTITIONER

## 2023-05-25 PROCEDURE — 82962 GLUCOSE BLOOD TEST: CPT

## 2023-05-25 PROCEDURE — 2060000000 HC ICU INTERMEDIATE R&B

## 2023-05-25 PROCEDURE — 84100 ASSAY OF PHOSPHORUS: CPT

## 2023-05-25 PROCEDURE — 83735 ASSAY OF MAGNESIUM: CPT

## 2023-05-25 RX ORDER — DILTIAZEM HYDROCHLORIDE 60 MG/1
30 TABLET, FILM COATED ORAL EVERY 6 HOURS SCHEDULED
Status: DISCONTINUED | OUTPATIENT
Start: 2023-05-25 | End: 2023-05-27 | Stop reason: HOSPADM

## 2023-05-25 RX ORDER — AMIODARONE HYDROCHLORIDE 200 MG/1
200 TABLET ORAL 2 TIMES DAILY
Status: DISCONTINUED | OUTPATIENT
Start: 2023-05-25 | End: 2023-05-27 | Stop reason: HOSPADM

## 2023-05-25 RX ADMIN — ACETAMINOPHEN 650 MG: 325 TABLET, FILM COATED ORAL at 10:44

## 2023-05-25 RX ADMIN — AMIODARONE HYDROCHLORIDE 200 MG: 200 TABLET ORAL at 21:04

## 2023-05-25 RX ADMIN — AMLODIPINE BESYLATE 2.5 MG: 5 TABLET ORAL at 08:30

## 2023-05-25 RX ADMIN — LEVETIRACETAM 500 MG: 500 TABLET, FILM COATED ORAL at 21:03

## 2023-05-25 RX ADMIN — LEVETIRACETAM 500 MG: 500 TABLET, FILM COATED ORAL at 08:32

## 2023-05-25 RX ADMIN — DILTIAZEM HYDROCHLORIDE 30 MG: 60 TABLET, FILM COATED ORAL at 13:57

## 2023-05-25 RX ADMIN — LEVOTHYROXINE SODIUM 50 MCG: 0.05 TABLET ORAL at 07:07

## 2023-05-25 RX ADMIN — AMIODARONE HYDROCHLORIDE 200 MG: 200 TABLET ORAL at 08:32

## 2023-05-25 RX ADMIN — ATORVASTATIN CALCIUM 20 MG: 20 TABLET, FILM COATED ORAL at 08:32

## 2023-05-25 RX ADMIN — LANSOPRAZOLE 30 MG: 30 TABLET, ORALLY DISINTEGRATING, DELAYED RELEASE ORAL at 07:07

## 2023-05-25 RX ADMIN — DILTIAZEM HYDROCHLORIDE 30 MG: 60 TABLET, FILM COATED ORAL at 21:03

## 2023-05-25 RX ADMIN — ACETAMINOPHEN 650 MG: 325 TABLET, FILM COATED ORAL at 23:29

## 2023-05-25 RX ADMIN — DILTIAZEM HYDROCHLORIDE 5 MG/HR: 5 INJECTION INTRAVENOUS at 12:21

## 2023-05-25 RX ADMIN — SODIUM CHLORIDE, PRESERVATIVE FREE 10 ML: 5 INJECTION INTRAVENOUS at 21:10

## 2023-05-25 ASSESSMENT — PAIN DESCRIPTION - ORIENTATION: ORIENTATION: LEFT

## 2023-05-25 ASSESSMENT — PAIN DESCRIPTION - LOCATION: LOCATION: SHOULDER

## 2023-05-26 LAB
ANION GAP SERPL CALC-SCNC: 3 MMOL/L (ref 5–15)
BUN SERPL-MCNC: 11 MG/DL (ref 6–20)
BUN/CREAT SERPL: 23 (ref 12–20)
CALCIUM SERPL-MCNC: 8.2 MG/DL (ref 8.5–10.1)
CHLORIDE SERPL-SCNC: 103 MMOL/L (ref 97–108)
CO2 SERPL-SCNC: 32 MMOL/L (ref 21–32)
CREAT SERPL-MCNC: 0.47 MG/DL (ref 0.55–1.02)
ERYTHROCYTE [DISTWIDTH] IN BLOOD BY AUTOMATED COUNT: 14.8 % (ref 11.5–14.5)
GLUCOSE BLD STRIP.AUTO-MCNC: 120 MG/DL (ref 65–117)
GLUCOSE BLD STRIP.AUTO-MCNC: 128 MG/DL (ref 65–117)
GLUCOSE BLD STRIP.AUTO-MCNC: 130 MG/DL (ref 65–117)
GLUCOSE SERPL-MCNC: 118 MG/DL (ref 65–100)
HCT VFR BLD AUTO: 31.6 % (ref 35–47)
HGB BLD-MCNC: 9.8 G/DL (ref 11.5–16)
MAGNESIUM SERPL-MCNC: 2 MG/DL (ref 1.6–2.4)
MCH RBC QN AUTO: 28.6 PG (ref 26–34)
MCHC RBC AUTO-ENTMCNC: 31 G/DL (ref 30–36.5)
MCV RBC AUTO: 92.1 FL (ref 80–99)
NRBC # BLD: 0 K/UL (ref 0–0.01)
NRBC BLD-RTO: 0 PER 100 WBC
PLATELET # BLD AUTO: 240 K/UL (ref 150–400)
PMV BLD AUTO: 8.8 FL (ref 8.9–12.9)
POTASSIUM SERPL-SCNC: 3.5 MMOL/L (ref 3.5–5.1)
RBC # BLD AUTO: 3.43 M/UL (ref 3.8–5.2)
SERVICE CMNT-IMP: ABNORMAL
SODIUM SERPL-SCNC: 138 MMOL/L (ref 136–145)
WBC # BLD AUTO: 4.1 K/UL (ref 3.6–11)

## 2023-05-26 PROCEDURE — 80048 BASIC METABOLIC PNL TOTAL CA: CPT

## 2023-05-26 PROCEDURE — 2060000000 HC ICU INTERMEDIATE R&B

## 2023-05-26 PROCEDURE — 97530 THERAPEUTIC ACTIVITIES: CPT

## 2023-05-26 PROCEDURE — 6370000000 HC RX 637 (ALT 250 FOR IP): Performed by: INTERNAL MEDICINE

## 2023-05-26 PROCEDURE — 83735 ASSAY OF MAGNESIUM: CPT

## 2023-05-26 PROCEDURE — 92507 TX SP LANG VOICE COMM INDIV: CPT

## 2023-05-26 PROCEDURE — 6370000000 HC RX 637 (ALT 250 FOR IP): Performed by: NURSE PRACTITIONER

## 2023-05-26 PROCEDURE — 82962 GLUCOSE BLOOD TEST: CPT

## 2023-05-26 PROCEDURE — 36415 COLL VENOUS BLD VENIPUNCTURE: CPT

## 2023-05-26 PROCEDURE — 85027 COMPLETE CBC AUTOMATED: CPT

## 2023-05-26 PROCEDURE — 2580000003 HC RX 258: Performed by: NURSE PRACTITIONER

## 2023-05-26 PROCEDURE — 99232 SBSQ HOSP IP/OBS MODERATE 35: CPT | Performed by: INTERNAL MEDICINE

## 2023-05-26 RX ADMIN — AMIODARONE HYDROCHLORIDE 200 MG: 200 TABLET ORAL at 21:25

## 2023-05-26 RX ADMIN — LEVETIRACETAM 500 MG: 500 TABLET, FILM COATED ORAL at 09:21

## 2023-05-26 RX ADMIN — ACETAMINOPHEN 650 MG: 325 TABLET, FILM COATED ORAL at 21:24

## 2023-05-26 RX ADMIN — AMIODARONE HYDROCHLORIDE 200 MG: 200 TABLET ORAL at 09:21

## 2023-05-26 RX ADMIN — DILTIAZEM HYDROCHLORIDE 30 MG: 60 TABLET, FILM COATED ORAL at 02:18

## 2023-05-26 RX ADMIN — LEVETIRACETAM 500 MG: 500 TABLET, FILM COATED ORAL at 21:24

## 2023-05-26 RX ADMIN — ATORVASTATIN CALCIUM 20 MG: 20 TABLET, FILM COATED ORAL at 09:21

## 2023-05-26 RX ADMIN — LANSOPRAZOLE 30 MG: 30 TABLET, ORALLY DISINTEGRATING, DELAYED RELEASE ORAL at 06:15

## 2023-05-26 RX ADMIN — LEVOTHYROXINE SODIUM 50 MCG: 0.05 TABLET ORAL at 06:15

## 2023-05-26 RX ADMIN — DILTIAZEM HYDROCHLORIDE 30 MG: 60 TABLET, FILM COATED ORAL at 09:21

## 2023-05-26 RX ADMIN — SODIUM CHLORIDE, PRESERVATIVE FREE 10 ML: 5 INJECTION INTRAVENOUS at 21:28

## 2023-05-26 RX ADMIN — DILTIAZEM HYDROCHLORIDE 30 MG: 60 TABLET, FILM COATED ORAL at 14:12

## 2023-05-26 RX ADMIN — DILTIAZEM HYDROCHLORIDE 30 MG: 60 TABLET, FILM COATED ORAL at 21:25

## 2023-05-26 ASSESSMENT — PAIN SCALES - GENERAL
PAINLEVEL_OUTOF10: 8
PAINLEVEL_OUTOF10: 8

## 2023-05-26 ASSESSMENT — PAIN DESCRIPTION - LOCATION: LOCATION: HEAD

## 2023-05-27 VITALS
HEART RATE: 71 BPM | OXYGEN SATURATION: 97 % | HEIGHT: 63 IN | SYSTOLIC BLOOD PRESSURE: 141 MMHG | WEIGHT: 223 LBS | BODY MASS INDEX: 39.51 KG/M2 | RESPIRATION RATE: 18 BRPM | TEMPERATURE: 97.5 F | DIASTOLIC BLOOD PRESSURE: 57 MMHG

## 2023-05-27 LAB
ERYTHROCYTE [DISTWIDTH] IN BLOOD BY AUTOMATED COUNT: 14.7 % (ref 11.5–14.5)
GLUCOSE BLD STRIP.AUTO-MCNC: 109 MG/DL (ref 65–117)
GLUCOSE BLD STRIP.AUTO-MCNC: 113 MG/DL (ref 65–117)
GLUCOSE BLD STRIP.AUTO-MCNC: 127 MG/DL (ref 65–117)
HCT VFR BLD AUTO: 32.3 % (ref 35–47)
HGB BLD-MCNC: 9.6 G/DL (ref 11.5–16)
MCH RBC QN AUTO: 27.7 PG (ref 26–34)
MCHC RBC AUTO-ENTMCNC: 29.7 G/DL (ref 30–36.5)
MCV RBC AUTO: 93.1 FL (ref 80–99)
NRBC # BLD: 0 K/UL (ref 0–0.01)
NRBC BLD-RTO: 0 PER 100 WBC
PLATELET # BLD AUTO: 259 K/UL (ref 150–400)
PMV BLD AUTO: 8.9 FL (ref 8.9–12.9)
RBC # BLD AUTO: 3.47 M/UL (ref 3.8–5.2)
SERVICE CMNT-IMP: ABNORMAL
SERVICE CMNT-IMP: NORMAL
SERVICE CMNT-IMP: NORMAL
WBC # BLD AUTO: 3.5 K/UL (ref 3.6–11)

## 2023-05-27 PROCEDURE — 82962 GLUCOSE BLOOD TEST: CPT

## 2023-05-27 PROCEDURE — 6370000000 HC RX 637 (ALT 250 FOR IP): Performed by: INTERNAL MEDICINE

## 2023-05-27 PROCEDURE — 6370000000 HC RX 637 (ALT 250 FOR IP): Performed by: NURSE PRACTITIONER

## 2023-05-27 PROCEDURE — 36415 COLL VENOUS BLD VENIPUNCTURE: CPT

## 2023-05-27 PROCEDURE — 85027 COMPLETE CBC AUTOMATED: CPT

## 2023-05-27 RX ORDER — LEVETIRACETAM 500 MG/1
500 TABLET ORAL 2 TIMES DAILY
Qty: 60 TABLET | Refills: 0 | Status: SHIPPED
Start: 2023-05-27

## 2023-05-27 RX ORDER — LIDOCAINE 4 G/G
1 PATCH TOPICAL DAILY
Qty: 30 PATCH | Refills: 0 | Status: SHIPPED
Start: 2023-05-27

## 2023-05-27 RX ORDER — LANSOPRAZOLE 30 MG/1
30 TABLET, ORALLY DISINTEGRATING, DELAYED RELEASE ORAL
Qty: 30 TABLET | Refills: 0 | Status: SHIPPED
Start: 2023-05-28

## 2023-05-27 RX ADMIN — ATORVASTATIN CALCIUM 20 MG: 20 TABLET, FILM COATED ORAL at 09:17

## 2023-05-27 RX ADMIN — LEVETIRACETAM 500 MG: 500 TABLET, FILM COATED ORAL at 09:16

## 2023-05-27 RX ADMIN — LANSOPRAZOLE 30 MG: 30 TABLET, ORALLY DISINTEGRATING, DELAYED RELEASE ORAL at 06:44

## 2023-05-27 RX ADMIN — AMIODARONE HYDROCHLORIDE 200 MG: 200 TABLET ORAL at 09:17

## 2023-05-27 RX ADMIN — LEVOTHYROXINE SODIUM 50 MCG: 0.05 TABLET ORAL at 06:44

## 2023-05-27 RX ADMIN — ACETAMINOPHEN 650 MG: 325 TABLET, FILM COATED ORAL at 13:20

## 2023-05-27 RX ADMIN — DILTIAZEM HYDROCHLORIDE 30 MG: 60 TABLET, FILM COATED ORAL at 09:19

## 2023-05-27 ASSESSMENT — PAIN SCALES - GENERAL: PAINLEVEL_OUTOF10: 4

## 2023-05-27 NOTE — DISCHARGE SUMMARY
Discharge Summary       PATIENT ID: Denton Quiroga  MRN: [id number]   YOB: 1945    DATE OF ADMISSION: 5/21/2023  1:41 PM    DATE OF DISCHARGE: 5/27/2023   PRIMARY CARE PROVIDER: Rj Perkins MD   ATTENDING PHYSICIAN: Josh Aguila MD  DISCHARGING PROVIDER: DOLORES Ramirez - NP      CONSULTATIONS: IP CONSULT TO NEUROLOGY  IP CONSULT TO CARDIOLOGY    PROCEDURES/SURGERIES: * No surgery found *     [address] COURSE:     Ms. Luz Maria Marshall is a 65 y.o. female with a PMHx of A-fib on eliquis, T2DM, GERD, HLD, HTN, hypokalemia and hypothyroidism. Pt presented to the ED as a transfer from [facility] after [address] found on CT with right-sided paralysis. Neurosurgery reviewed imaging and determined that  no intervention was recommended given the patient was on eliquis. Patient experienced A-fib with RVR while in the ICU and was rate controlled with diltiazem gtt. Cardiology consulted and the patient was transitioned to Amiodarone bolus and gtt as patient was previously on PO amiodarone. Hospitalist consulted for transfer out of the ICU (5/23/23). DISCHARGE DIAGNOSES / PLAN:      Intracerebral hemorrhage  Right sided hemiparesis  - MRI brain: Posterior cerebral intraparenchymal hemorrhage with surrounding edema and mass effect on the L lateral ventricle.    - NSGY- no surgical intervention   - appreciate Neurology input    - Continue keppra, atorvastatin   - SBP goal < 140 - rending that way  - repeat CT head 5/24: stable intracranial hemorrhage  - PT/OT/SLP to continue at [facility]    Atrial Fibrillation with RVR  - Amiodarone bolus and gtt - transitioned to po amiodarone 5/24 but back into rapid afib again am of 5/25.   Alerted cardiology, started back on cardizem gtt  - transitioned off of the diltiazem drip 5/25.  -As per cardiology recommendations, amiodarone dosing has increased in frequency, started on p.o. diltiazem   -Heart rate and rhythm has been stable today - NSR  - off AC due to [address]  -

## 2023-05-27 NOTE — PLAN OF CARE
Problem: Discharge Planning  Goal: Discharge to home or other facility with appropriate resources  5/27/2023 1116 by Abhi Coronado RN  Outcome: Progressing  Flowsheets (Taken 5/27/2023 0800)  Discharge to home or other facility with appropriate resources: Identify barriers to discharge with patient and caregiver  5/26/2023 2250 by Joce Henley RN  Outcome: Progressing     Problem: Skin/Tissue Integrity  Goal: Absence of new skin breakdown  Description: 1. Monitor for areas of redness and/or skin breakdown  2. Assess vascular access sites hourly  3. Every 4-6 hours minimum:  Change oxygen saturation probe site  4. Every 4-6 hours:  If on nasal continuous positive airway pressure, respiratory therapy assess nares and determine need for appliance change or resting period.   5/27/2023 1116 by Abhi Coronado RN  Outcome: Progressing  5/26/2023 2250 by Joce Henley RN  Outcome: Progressing     Problem: Safety - Adult  Goal: Free from fall injury  Outcome: Progressing     Problem: ABCDS Injury Assessment  Goal: Absence of physical injury  5/27/2023 1116 by Abhi Coronado RN  Outcome: Progressing  5/26/2023 2250 by Joce Henley RN  Outcome: Progressing     Problem: Chronic Conditions and Co-morbidities  Goal: Patient's chronic conditions and co-morbidity symptoms are monitored and maintained or improved  5/27/2023 1116 by Abhi Coronado RN  Outcome: Progressing  Flowsheets (Taken 5/27/2023 0800)  Care Plan - Patient's Chronic Conditions and Co-Morbidity Symptoms are Monitored and Maintained or Improved: Monitor and assess patient's chronic conditions and comorbid symptoms for stability, deterioration, or improvement  5/26/2023 2250 by Joce Henley RN  Outcome: Progressing  Flowsheets (Taken 5/26/2023 2000)  Care Plan - Patient's Chronic Conditions and Co-Morbidity Symptoms are Monitored and Maintained or Improved: Monitor and assess patient's chronic conditions and comorbid symptoms for stability, deterioration, or
Problem: Discharge Planning  Goal: Discharge to home or other facility with appropriate resources  Outcome: Progressing  Flowsheets (Taken 5/24/2023 0800)  Discharge to home or other facility with appropriate resources: Identify barriers to discharge with patient and caregiver     Problem: Skin/Tissue Integrity  Goal: Absence of new skin breakdown  Description: 1. Monitor for areas of redness and/or skin breakdown  2. Assess vascular access sites hourly  3. Every 4-6 hours minimum:  Change oxygen saturation probe site  4. Every 4-6 hours:  If on nasal continuous positive airway pressure, respiratory therapy assess nares and determine need for appliance change or resting period. Outcome: Progressing     Problem: Chronic Conditions and Co-morbidities  Goal: Patient's chronic conditions and co-morbidity symptoms are monitored and maintained or improved  Outcome: Progressing  Flowsheets (Taken 5/24/2023 0800)  Care Plan - Patient's Chronic Conditions and Co-Morbidity Symptoms are Monitored and Maintained or Improved: Monitor and assess patient's chronic conditions and comorbid symptoms for stability, deterioration, or improvement     Problem: Physical Therapy - Adult  Goal: By Discharge: Performs mobility at highest level of function for planned discharge setting. See evaluation for individualized goals. Description: FUNCTIONAL STATUS PRIOR TO ADMISSION: Patient was independent and active without use of DME. Pt still drives    HOME SUPPORT PRIOR TO ADMISSION: The patient lived with family (sister and nephew) but did not require assistance. Physical Therapy Goals  Initiated 5/22/2023  1. Patient will move from supine to sit and sit to supine and roll side to side in bed with maximal assistance within 7 day(s). 2.  Patient will perform sit to stand with maximal assistance x 2 within 7 day(s).   3.  Patient will transfer from bed to chair and chair to bed with maximal assistance using the least restrictive
Problem: Discharge Planning  Goal: Discharge to home or other facility with appropriate resources  Outcome: Progressing  Flowsheets (Taken 5/26/2023 0800)  Discharge to home or other facility with appropriate resources: Identify barriers to discharge with patient and caregiver     Problem: Skin/Tissue Integrity  Goal: Absence of new skin breakdown  Description: 1. Monitor for areas of redness and/or skin breakdown  2. Assess vascular access sites hourly  3. Every 4-6 hours minimum:  Change oxygen saturation probe site  4. Every 4-6 hours:  If on nasal continuous positive airway pressure, respiratory therapy assess nares and determine need for appliance change or resting period.   Outcome: Progressing     Problem: Safety - Adult  Goal: Free from fall injury  Outcome: Progressing     Problem: Chronic Conditions and Co-morbidities  Goal: Patient's chronic conditions and co-morbidity symptoms are monitored and maintained or improved  Outcome: Progressing  Flowsheets (Taken 5/26/2023 0800)  Care Plan - Patient's Chronic Conditions and Co-Morbidity Symptoms are Monitored and Maintained or Improved: Monitor and assess patient's chronic conditions and comorbid symptoms for stability, deterioration, or improvement     Problem: Pain  Goal: Verbalizes/displays adequate comfort level or baseline comfort level  Outcome: Progressing
Problem: Occupational Therapy - Adult  Goal: By Discharge: Performs self-care activities at highest level of function for planned discharge setting. See evaluation for individualized goals. Description: FUNCTIONAL STATUS PRIOR TO ADMISSION:  Patient was independent with ADLs and functional mobility. Family reports no hx of falls. Enjoys reading. HOME SUPPORT: Patient lived multiple family members. Occupational Therapy Goals  Initiated 5/22/2023   1. Patient will perform self-feeding with Moderate Assist within 7 day(s). 2.  Patient will perform bathing with Moderate Assist within 7 day(s). 3.  Patient will perform upper body dressing via luca dressing technique with Moderate Assist within 7 day(s). 4.  Patient will perform toilet transfers with Maximal Assist within 7 day(s). 5.  Patient will perform all aspects of toileting with Maximal Assist within 7 day(s). 6.  Patient will participate in upper extremity therapeutic exercise/activities (AAROM/SPROM) with Moderate Assist within 7 day(s). 7.  Patient will utilize energy conservation techniques during functional activities with verbal, visual, and tactile cues within 7 day(s). 8.  Patient will improve their Fugl Tang score by 5 points in prep for ADLs within 7 days. (Scored 1/66 on 5/22/23)  Outcome: Progressing     OCCUPATIONAL THERAPY TREATMENT  Patient: Sierra Brian (32 y.o. female)  Date: 5/25/2023  Primary Diagnosis: Intraparenchymal hemorrhage of brain [facility]) [I61.9]       Precautions: Fall Risk (SBP <140)                Chart, occupational therapy assessment, plan of care, and goals were reviewed. ASSESSMENT  Patient continues to benefit from skilled OT services and is progressing towards goals. Pt cleared for therapy by nursing and received supine in bed agreeable to therapy. Overall Max A for bed mobility to sit EOB. Required verbal and tactile cues and up to Max A for static seated balance.  Improved with time and utilizing handle
Problem: Occupational Therapy - Adult  Goal: By Discharge: Performs self-care activities at highest level of function for planned discharge setting. See evaluation for individualized goals. Description: FUNCTIONAL STATUS PRIOR TO ADMISSION:  Patient was independent with ADLs and functional mobility. Family reports no hx of falls. Enjoys reading. HOME SUPPORT: Patient lived multiple family members. Occupational Therapy Goals  Initiated 5/22/2023   1. Patient will perform self-feeding with Moderate Assist within 7 day(s). 2.  Patient will perform bathing with Moderate Assist within 7 day(s). 3.  Patient will perform upper body dressing via luca dressing technique with Moderate Assist within 7 day(s). 4.  Patient will perform toilet transfers with Maximal Assist within 7 day(s). 5.  Patient will perform all aspects of toileting with Maximal Assist within 7 day(s). 6.  Patient will participate in upper extremity therapeutic exercise/activities (AAROM/SPROM) with Moderate Assist within 7 day(s). 7.  Patient will utilize energy conservation techniques during functional activities with verbal, visual, and tactile cues within 7 day(s). 8.  Patient will improve their Fugl Tang score by 5 points in prep for ADLs within 7 days. (Scored 1/66 on 5/22/23)  Outcome: Progressing   OCCUPATIONAL THERAPY EVALUATION    Patient: Mireya Doyle (57 y.o. female)  Date: 5/22/2023  Primary Diagnosis: Intraparenchymal hemorrhage of brain ([facility] [address] [I61.9]       Precautions: Fall Risk (SBP <140)     ASSESSMENT :  The patient is limited by decreased  endurance, R sided hemiparesis, increased RUE/RLE tone, generalized deconditioning, incontinence,  R sided inattention, L eye gaze preference, decreased proprioception, impaired speech and impaired cognition following admission for CVA workup. MRI showing \"intraparenchymal hemorrhage in the left parietal lobe\".      Patient received with bladder incontinence, requiring total A -
Problem: Occupational Therapy - Adult  Goal: By Discharge: Performs self-care activities at highest level of function for planned discharge setting. See evaluation for individualized goals. Description: FUNCTIONAL STATUS PRIOR TO ADMISSION:  Patient was independent with ADLs and functional mobility. Family reports no hx of falls. Enjoys reading. HOME SUPPORT: Patient lived multiple family members. Occupational Therapy Goals  Initiated 5/22/2023   1. Patient will perform self-feeding with Moderate Assist within 7 day(s). 2.  Patient will perform bathing with Moderate Assist within 7 day(s). 3.  Patient will perform upper body dressing via luca dressing technique with Moderate Assist within 7 day(s). 4.  Patient will perform toilet transfers with Maximal Assist within 7 day(s). 5.  Patient will perform all aspects of toileting with Maximal Assist within 7 day(s). 6.  Patient will participate in upper extremity therapeutic exercise/activities (AAROM/SPROM) with Moderate Assist within 7 day(s). 7.  Patient will utilize energy conservation techniques during functional activities with verbal, visual, and tactile cues within 7 day(s). 8.  Patient will improve their Fugl Tang score by 5 points in prep for ADLs within 7 days. (Scored 1/66 on 5/22/23)  Outcome: Progressing   OCCUPATIONAL THERAPY TREATMENT  Patient: Herberth Johns (45 y.o. female)  Date: 5/23/2023  Primary Diagnosis: Intraparenchymal hemorrhage of brain [facility]) [I61.9]       Precautions: Fall Risk (SBP <140)                Chart, occupational therapy assessment, plan of care, and goals were reviewed. ASSESSMENT  Patient continues to benefit from skilled OT services and is progressing towards goals. Patient engaged in seated grooming tasks while in chair position on this date. Continues to display LUE pushing toward R/hemiplegic side, pillow supports and repositioning 3-4x throughout session requiring.  Patient displays R sided
Problem: Occupational Therapy - Adult  Goal: By Discharge: Performs self-care activities at highest level of function for planned discharge setting. See evaluation for individualized goals. Description: FUNCTIONAL STATUS PRIOR TO ADMISSION:  Patient was independent with ADLs and functional mobility. Family reports no hx of falls. Enjoys reading. HOME SUPPORT: Patient lived multiple family members. Occupational Therapy Goals  Initiated 5/22/2023   1. Patient will perform self-feeding with Moderate Assist within 7 day(s). 2.  Patient will perform bathing with Moderate Assist within 7 day(s). 3.  Patient will perform upper body dressing via luca dressing technique with Moderate Assist within 7 day(s). 4.  Patient will perform toilet transfers with Maximal Assist within 7 day(s). 5.  Patient will perform all aspects of toileting with Maximal Assist within 7 day(s). 6.  Patient will participate in upper extremity therapeutic exercise/activities (AAROM/SPROM) with Moderate Assist within 7 day(s). 7.  Patient will utilize energy conservation techniques during functional activities with verbal, visual, and tactile cues within 7 day(s). 8.  Patient will improve their Fugl Tang score by 5 points in prep for ADLs within 7 days. (Scored 1/66 on 5/22/23)  Outcome: Progressing   OCCUPATIONAL THERAPY TREATMENT  Patient: Imani Freire (21 y.o. female)  Date: 5/24/2023  Primary Diagnosis: Intraparenchymal hemorrhage of brain [facility]) [I61.9]       Precautions: Fall Risk (SBP <140)                Chart, occupational therapy assessment, plan of care, and goals were reviewed. ASSESSMENT  Patient continues to benefit from skilled OT services and is progressing towards goals. Nursing cleared for therapy and received in bed with family present. Patient agreeable to therapy.  She presents with impaired sitting balance, mobility, RUE hemiplegia, R LE hemiparesis/tone, expressive aphasia, R inattention impacting her
Problem: Occupational Therapy - Adult  Goal: By Discharge: Performs self-care activities at highest level of function for planned discharge setting. See evaluation for individualized goals. Description: FUNCTIONAL STATUS PRIOR TO ADMISSION:  Patient was independent with ADLs and functional mobility. Family reports no hx of falls. Enjoys reading. HOME SUPPORT: Patient lived multiple family members. Occupational Therapy Goals  Initiated 5/22/2023 ; cont goals 5/26   1. Patient will perform self-feeding with Moderate Assist within 7 day(s). 2.  Patient will perform bathing with Moderate Assist within 7 day(s). 3.  Patient will perform upper body dressing via luca dressing technique with Moderate Assist within 7 day(s). 4.  Patient will perform toilet transfers with Maximal Assist within 7 day(s). 5.  Patient will perform all aspects of toileting with Maximal Assist within 7 day(s). 6.  Patient will participate in upper extremity therapeutic exercise/activities (AAROM/SPROM) with Moderate Assist within 7 day(s). 7.  Patient will utilize energy conservation techniques during functional activities with verbal, visual, and tactile cues within 7 day(s). 8.  Patient will improve their Fugl Tang score by 5 points in prep for ADLs within 7 days. (Scored 1/66 on 5/22/23)  5/26/2023 1401 by Claudetta Cellar, OT  Outcome: Progressing  5/26/2023 1400 by Claudetta Cellar, OT  Outcome: Progressing     OCCUPATIONAL THERAPY TREATMENT: WEEKLY REASSESSMENT    Patient: Argelia Vizcarra (39 y.o. female)  Date: 5/26/2023  Primary Diagnosis: Intraparenchymal hemorrhage of brain [facility]) [I61.9]       Precautions: Fall Risk (SBP <140)                Chart, occupational therapy assessment, plan of care, and goals were reviewed. ASSESSMENT  Patient continues to benefit from skilled OT services and is progressing towards goals. Patient tolerated EOB activity and multiple standing attempts.   Continues to be highly motivated to participate
Problem: Physical Therapy - Adult  Goal: By Discharge: Performs mobility at highest level of function for planned discharge setting. See evaluation for individualized goals. Description: FUNCTIONAL STATUS PRIOR TO ADMISSION: Patient was independent and active without use of DME. Pt still drives    HOME SUPPORT PRIOR TO ADMISSION: The patient lived with family (sister and nephew) but did not require assistance. Physical Therapy Goals  Initiated 5/22/2023  1. Patient will move from supine to sit and sit to supine and roll side to side in bed with maximal assistance within 7 day(s). 2.  Patient will perform sit to stand with maximal assistance x 2 within 7 day(s). 3.  Patient will transfer from bed to chair and chair to bed with maximal assistance using the least restrictive device within 7 day(s). 4.  Patient will tolerate seated EOB with mod A x 10 minutes within 7 days  6. Patient will improve Parkinson Balance score by 7 points within 7 days. Outcome: Progressing     PHYSICAL THERAPY TREATMENT    Patient: Ekaterina Bright (86 y.o. female)  Date: 5/26/2023  Diagnosis: Intraparenchymal hemorrhage of brain (HCC) [I61.9] Intraparenchymal hemorrhage of brain (HCC)      Precautions: Fall Risk (SBP <140)                    ASSESSMENT:  Patient continues to benefit from skilled PT services and is slowly progressing towards goals. Patient received in bed and agreeable to treatment. Requires max to total A x 2 to reach EOB, strong R lateral lean noted with contralateral push with LUE. With cues for LUE in lap, sitting balance improved slightly but continued to require constant propped support. With use of LUE pulling on back bar of recliner, patient able to maintain sitting balance without support however R lean continued. Attempt to transfer with LUE pulling up on bar and max total A x 2.  Patient with good effort to stand with LUE/LLE however R side required extensive assist including R knee blocking, glut
Problem: Physical Therapy - Adult  Goal: By Discharge: Performs mobility at highest level of function for planned discharge setting. See evaluation for individualized goals. Description: FUNCTIONAL STATUS PRIOR TO ADMISSION: Patient was independent and active without use of DME. Pt still drives    HOME SUPPORT PRIOR TO ADMISSION: The patient lived with family (sister and nephew) but did not require assistance. Physical Therapy Goals  Initiated 5/22/2023  1. Patient will move from supine to sit and sit to supine and roll side to side in bed with maximal assistance within 7 day(s). 2.  Patient will perform sit to stand with maximal assistance x 2 within 7 day(s). 3.  Patient will transfer from bed to chair and chair to bed with maximal assistance using the least restrictive device within 7 day(s). 4.  Patient will tolerate seated EOB with mod A x 10 minutes within 7 days  6. Patient will improve Parkinson Balance score by 7 points within 7 days. Outcome: Progressing     PHYSICAL THERAPY TREATMENT    Patient: Isabelle Lu (87 y.o. female)  Date: 5/24/2023  Diagnosis: Intraparenchymal hemorrhage of brain (HCC) [I61.9] Intraparenchymal hemorrhage of brain (HCC)      Precautions: Fall Risk (SBP <140)                    ASSESSMENT:  Patient continues to benefit from skilled PT services and is slowly progressing towards goals. Patient received in bed with gissel at bedside. She endorses fatigue and noted to have increased RR (high 30s), some wheezing however SpO2 >96% on RA. Patient agreeable to session. Required max A x 2 to reach EOB, good attempts to engage LUE reaching across bed to bedrail. Patient with significant tone noted in RLE limiting ability to easily move RLE into abduction when reaching EOB. Patient tolerates sitting on EOB with initial strong R lateral lean/LOB, requires tactile and verbal cues to attempt to correct.  Eventually with use of back bar on recliner to reach out with LUE and pull
Speech LAnguage Pathology TREATMENT    Patient: Nadege Mccullough (67 y.o. female)  Date: 5/23/2023  Primary Diagnosis: Intraparenchymal hemorrhage of brain ([facility] [address] [I61.9]       Precautions:  Fall Risk (SBP <140)                  ASSESSMENT :  Based on the objective data described below, the [facility] now that she has her dentures, with functional mastication and no overt s/s aspiration observed. Of note, belching noted after PO intake. No further needs for swallowing, therefore will complete POC. Trialed communication board today per daughter request, however patient unable to reliably identify pictures given F:2. Therefore, communication board not yet appropriate for patient at this time, although patient was limited by drowsiness. Discussed this with son and daughter-in-law at bedside, and recommend trialing communication board only with SLP during treatment at this time until patient more successful. Both verbalized understanding. Patient will benefit from skilled intervention to address the above impairments. PLAN :  Recommendations and Planned Interventions:  Diet: Regular and thin liquids  Recommend utilizing the following strategies to facilitate patient's functional communication  -- Utilize simple Yes/No questions to obtain information  -- Provide patient with visual cues   -- Allow patient extra time to process and respond  -- Use simple, 1-step commands  SLP to follow for speech/language treatment, and trialing picture identification in preparation for communication board use       Acute SLP Services: Yes, SLP will continue to follow per plan of care. Discharge Recommendations: To Be Determined     SUBJECTIVE:   Patient with no verbalizations.     OBJECTIVE:     Past Medical History:   Diagnosis Date    Atrial fibrillation ([facility] [address]     Diabetes ([facility] [address]     GERD (gastroesophageal reflux disease)     High cholesterol     Hypertension     Hypokalemia     Thyroid
RN  Outcome: Progressing  Flowsheets  Taken 5/24/2023 2000 by Regla Shelton RN  Care Plan - Patient's Chronic Conditions and Co-Morbidity Symptoms are Monitored and Maintained or Improved: Monitor and assess patient's chronic conditions and comorbid symptoms for stability, deterioration, or improvement  Taken 5/24/2023 0800 by Americo hCarles 34 - Patient's Chronic Conditions and Co-Morbidity Symptoms are Monitored and Maintained or Improved: Monitor and assess patient's chronic conditions and comorbid symptoms for stability, deterioration, or improvement     Problem: Pain  Goal: Verbalizes/displays adequate comfort level or baseline comfort level  5/25/2023 1022 by Mendoza Mann RN  Outcome: Progressing  5/24/2023 2132 by Neeraj Pace RN  Outcome: Progressing
session to promote neutral upright posture. Noted to have clonus with R ankle DF/PF PROM today. High tone in RLE during PROM. Educated patient/family on HEP in bed with LLE and to attempt with RLE. Handout provided. Will continue to follow in acute setting. Plan to progress to EOB activity tomorrow with increased alertness/participation. PLAN:  Patient continues to benefit from skilled intervention to address the above impairments. Continue treatment per established plan of care. Recommendation for discharge: (in order for the patient to meet his/her long term goals): Therapy 3 hours/day 5-7 days/week    Other factors to consider for discharge: patient's current support system is unable to meet their requirements for physical assistance, impaired cognition, not safe to be alone, and concern for safely navigating or managing the home environment    IF patient discharges home will need the following DME: continuing to assess with progress       SUBJECTIVE:   Patient non verbal during session but answering yes/no questions appropriately. OBJECTIVE DATA SUMMARY:       Functional Mobility Training:  Bed Mobility:  Bed Mobility Training  Bed Mobility Training: Yes  Supine to Sit: Maximum assistance; Total assistance;Assist X2  Scooting: Maximum assistance; Total assistance;Assist X2  Transfers:   Infer liam lift  Balance:  Balance  Sitting: Impaired  Sitting - Static: Poor (constant support)  Sitting - Dynamic: Not tested   Ambulation/Gait Training:   Non ambulatory      Neuro Re-Education:   Lateral leans, forward lean to improve trunk control       Activity Tolerance:   Fair  and requires rest breaks    After treatment:   Patient left in no apparent distress in bed, Call bell within reach, Caregiver / family present, Side rails x3, Heels elevated for pressure relief, and in modified chair position      COMMUNICATION/EDUCATION:   The patient's plan of care was discussed with: occupational therapist and
PROCEDURE N/A 5/15/2023    Ablation following A-fib addl performed by Kat Garcia MD at [address] [facility]      vaginal reconstruction for bladder prolapse after 1st surgery failed    INVASIVE VASCULAR N/A 5/15/2023    Ultrasound guided vascular access performed by Kat Garcia MD at [address] Bilateral     carpal tunnel    ORTHOPEDIC SURGERY Bilateral     pt stated toe surgery; eunicee    OTHER SURGICAL HISTORY      liver cyst drained    TOTAL KNEE ARTHROPLASTY Bilateral     UROLOGICAL SURGERY      Bladder prolapse surgery       Home Situation:  Social/Functional History  Lives With: Family (sister granddaughter/grandson and nephew; [address])  Type of Home: House  Home Layout: One level  Home Access: Stairs to enter with rails (graciela hand rails)  Entrance Stairs - Number of Steps: 5  Bathroom Shower/Tub: Walk-in shower  Bathroom Equipment: Shower chair  Home Equipment: Garon Lusher, rolling, Cane    Cognitive/Behavioral Status:  Orientation  Orientation Level: Unable to assess (pt nods yes and no, non verbal during session)         Strength:    Strength: Grossly decreased, non-functional    Tone & Sensation:   Tone: Abnormal (increased R LE tone)  Sensation:  (unable to formally assess due to poor communication)    Coordination:  Coordination: Grossly decreased, non-functional    Range Of Motion:  AROM: Generally decreased, functional (RLE and RUE grossly decreased)  PROM: Grossly decreased, non-functional (RUE and RLE)    Functional Mobility:  Bed Mobility:     Bed Mobility Training  Bed Mobility Training: Yes  Rolling: Total assistance;Maximum assistance;Assist X2  Supine to Sit: Maximum assistance;Assist X2  Sit to Supine: Maximum assistance;Assist X2  Scooting:  Total assistance;Assist X2    Balance:        Balance  Sitting: Impaired  Sitting - Static: Poor (constant support) (pushing towards the R and posteriorly with LUE in weight bearing position)
attention/concentration, Followed 1-2 step commands, Cognitive assessment ongoing as language improves    Motor Speech:  Apraxic Characteristics: Visible/audible searching  Dysarthric Characteristics: Decreased rate;Blended word boundaries; Imprecise;Decreased breath support;Decreased prosody  Intelligibility: Impaired  Word Intelligibility (%): 60%  Sentence Intelligibility (%): 30%  Overall Impairment Severity: Moderate    Language Comprehension and Expression:  Auditory Comprehension  Simple yes/no questions: WFL  Moderate yes/no questions: WFL  Complex yes/no questions: WFL  One Step Commands: WFL  Two Step Commands: WFL  Multistep Commands: Moderate  Common Objects: WFL  Pictures: WFL  Conversation: Mild     Expression  Primary Mode of Expression: Verbal  Initiation: Mild   Repetition: Mild  Confrontation: Mild  Responsive: Mild  Conversation: Moderate  Interfering Components: Limited error awareness  Effective Techniques: Provide extra time; Overarticulate; Word retrived strategies; Decreased rate (Deep breath prior to speaking)    Respiratory Status/Airway:  Room air    After treatment:   Patient left in no apparent distress in bed, Call bell left within reach, Nursing notified, and Caregiver present    COMMUNICATION/EDUCATION:   The patient's plan of care including recommendations, planned interventions, and recommended diet changes were discussed with: Registered nurse and Family. Thank you,  vEy Mejia SLP  Minutes: 35     Problem: SLP Adult - Impaired Communication  Goal: By Discharge: Demonstrates communication skills at highest level of function for planned discharge setting. See evaluation for individualized goals. Description: Speech Therapy Goals  Initiated 5/22/23  1. Patient will participate in apraxia of speech tasks (ie melodic intonation therapy, dynamic temporal and tactile cueing, etc) with 50% accuracy within 7 days.  Goal met 5/26.   2. Patient will ID pictures given F:2 with no cues
extremity therapeutic exercise/activities (AAROM/SPROM) with Moderate Assist within 7 day(s). 7.  Patient will utilize energy conservation techniques during functional activities with verbal, visual, and tactile cues within 7 day(s). 8.  Patient will improve their Fugl Tang score by 5 points in prep for ADLs within 7 days. (Scored 1/66 on 5/22/23)  5/26/2023 1401 by Allison Everett OT  Outcome: Progressing  5/26/2023 1400 by Allison Everett, OT  Outcome: Progressing     Problem: SLP Adult - Impaired Communication  Goal: By Discharge: Demonstrates communication skills at highest level of function for planned discharge setting. See evaluation for individualized goals. Description: Speech Therapy Goals  Initiated 5/22/23    1. Patient will participate in apraxia of speech tasks (ie melodic intonation therapy, dynamic temporal and tactile cueing, etc) with 50% accuracy within 7 days. Goal met 5/26.   2. Patient will ID pictures given F:2 with no cues within 7 days. Added 5/23/2023, Goal met 5/26. 3. Patient will name at least 2 compensatory speech strategies with min cues within seven days. Goal initiated 5/26/23.   4. Patient will repeat multisyllabic words with 80% acc given min cues within seven days. Goal initiated 5/26/23.   5. Patient will name at least 2 compensatory word retrieval strategies with min cues within seven days. Goal initiated 5/26/23. 6. Patient will complete complex naming with 80% acc given min cues within seven days.  Goal initiated 5/26/23.   5/26/2023 1511 by CHATO Montesinos  Outcome: Progressing     Problem: Pain  Goal: Verbalizes/displays adequate comfort level or baseline comfort level  5/26/2023 2250 by Jluis Cintron, RN  Outcome: Progressing  5/26/2023 1033 by Keon Cifuentes, RN  Outcome: Progressing
gestures  Verbal Expression  Verbal Expression: Exceptions to functional limits  Initiation: Moderate  Repetition: Severe  Automatic Speech:  (able to spproximate words in automatic phrases x 2)  Conversation: Severe  Effective Techniques: Provide extra time          Voice:    + phonation at times, low volume             After treatment:   Patient left in no apparent distress sitting up in chair    COMMUNICATION/EDUCATION:   Patient was educated regarding her deficit(s) of apraxia as this relates to her diagnosis of CVA. She demonstrated Fair understanding as evidenced by Nodding. The patient's plan of care including recommendations, planned interventions, and recommended diet changes were discussed with: Registered nurse    Patient/family have participated as able in goal setting and plan of care    Thank you,  Silvio Kingston, SLP  Minutes: 42         Problem: SLP Adult - Impaired Communication  Goal: By Discharge: Demonstrates communication skills at highest level of function for planned discharge setting. See evaluation for individualized goals. Description: Speech Therapy Goals  Initiated 5/22/23    1. Patient will participate in apraxia of speech tasks (ie melodic intonation therapy, dynamic temporal and tactile cueing, etc) with 50% accuracy within 7 days.    2. Added 5/23/2023 Patient will ID pictures given F:2 with no cues within 7 days  Outcome: Progressing
max A x 2 with support on RUE/RLE)  Stand to Sit: Maximum assistance;Assist X2  Balance:  Balance  Sitting: Impaired  Sitting - Static: Fair (occasional); Poor (constant support) (poor requiring constant support for beginning of sitting EOB, progressed to fair to poor with use of back bar on recliner)  Sitting - Dynamic: Poor (constant support)  Standing: Impaired  Standing - Static: Constant support;Poor  Standing - Dynamic: Not tested   Ambulation/Gait Training:     NT     Pain Rating:  No pain reported     Activity Tolerance:   Fair  and requires frequent rest breaks    After treatment:   Patient left in no apparent distress in bed, Call bell within reach, Caregiver / family present, and Side rails x3      COMMUNICATION/EDUCATION:   The patient's plan of care was discussed with: occupational therapist and registered nurse    Patient Education  Education Given To: Patient  Education Provided: Role of Therapy;Plan of Care  Education Method: Verbal  Education Outcome: Verbalized understanding;Continued education needed      Richa Bobby PT  Minutes: 26
SLP  Minutes: 20      Problem: SLP Adult - Impaired Swallowing  Goal: By Discharge: Advance to least restrictive diet without signs or symptoms of aspiration for planned discharge setting. See evaluation for individualized goals. Description: Speech Therapy Goals  Initiated 5/22/23 1. Patient will tolerate baseline diet without adverse effects within 7 days. Outcome: Progressing     Problem: SLP Adult - Impaired Communication  Goal: By Discharge: Demonstrates communication skills at highest level of function for planned discharge setting. See evaluation for individualized goals. Description: Speech Therapy Goals  Initiated 5/22/23    1. Patient will participate in apraxia of speech tasks (ie melodic intonation therapy, dynamic temporal and tactile cueing, etc) with 50% accuracy within 7 days.    Outcome: Progressing

## 2023-05-27 NOTE — CARE COORDINATION
Transition of Care Plan:    RUR: 22%  Prior Level of Functioning:  patient was independent with out the use of DME  Disposition:  IPR  If SNF or IPR: Date FOC offered: 5/24/23  Date FOC received: 5/24/23  [facility]    Follow up appointments: PCP Neuro and Cardio  DME needed: To be arranged by IPR  Transportation at discharge: Hospital to Home stretcher  paid for out of pocket by son Viktoria Pallas  1 pm   IM/IMM Medicare/ letter given: 5/27/23  Caregiver Contact: Sal Sam  [phone]  Discharge Caregiver contacted prior to discharge? yes  Barriers to discharge:  none    Patient being discharged today   As requested, [facility] called and confirmed discharge with Irena liaison with [facility] --[phone]    Discharge summary faxed to [phone]     Nurse to call report to [phone] Room #125    [facility]ospital [facility] Home transport with 02  today at 1 pm    Cm left message for son confirming discharge and transport time.       2nd Medicare letter provided to patient    GOLDIE GrigsbyW

## 2023-05-27 NOTE — DISCHARGE INSTRUCTIONS
Discharge Summary         PATIENT ID: Brandie Westfall  MRN: [id number]   YOB: 1945    DATE OF ADMISSION: 5/21/2023  1:41 PM    DATE OF DISCHARGE: 5/27/2023   PRIMARY CARE PROVIDER: Emre Swenson MD   ATTENDING PHYSICIAN: Lino Guo MD  DISCHARGING PROVIDER: DOLORES Vasquez - NP       CONSULTATIONS: IP CONSULT TO NEUROLOGY  IP CONSULT TO CARDIOLOGY     PROCEDURES/SURGERIES: * No surgery found *      [address] COURSE:      Ms. Chris Montana is a 65 y.o. female with a PMHx of A-fib on eliquis, T2DM, GERD, HLD, HTN, hypokalemia and hypothyroidism. Pt presented to the ED as a transfer from [facility] after [address] found on [address] with right-sided paralysis. Neurosurgery reviewed imaging and determined that  no intervention was recommended given the patient was on eliquis. Patient experienced A-fib with RVR while in the ICU and was rate controlled with diltiazem gtt. Cardiology consulted and the patient was transitioned to Amiodarone bolus and gtt as patient was previously on PO amiodarone. Hospitalist consulted for transfer out of the ICU (5/23/23). DISCHARGE DIAGNOSES / PLAN:       Intracerebral hemorrhage  Right sided hemiparesis  - MRI brain: Posterior cerebral intraparenchymal hemorrhage with surrounding edema and mass effect on the L lateral ventricle.    - NSGY- no surgical intervention   - appreciate Neurology input    - Continue keppra, atorvastatin   - SBP goal < 140 - rending that way  - repeat CT head 5/24: stable intracranial hemorrhage  - PT/OT/SLP to continue at [facility]     Atrial Fibrillation with RVR  - Amiodarone bolus and gtt - transitioned to po amiodarone 5/24 but back into rapid afib again am of 5/25.   [address] cardiology, started back on cardizem gtt  - transitioned off of the diltiazem drip 5/25.  -As per cardiology recommendations, amiodarone dosing has increased in frequency, started on p.o. diltiazem   -Heart rate and rhythm has been stable today - NSR  - off AC due

## 2023-05-31 ENCOUNTER — TELEPHONE (OUTPATIENT)
Age: 78
End: 2023-05-31

## 2023-06-01 NOTE — TELEPHONE ENCOUNTER
Called Dr. Barbara Gonzales. Informed him that the doctor stated, \"Please inform that patient had bleeding in her brain while she was taking anticoagulation and she is not a candidate for any such medication at least for the next month. Alternative mechanical methods for DVT prophylaxis will need to be considered. She could get in touch with her PCP and should also follow-up with her cardiologist regarding atrial fibrillation. \"

## 2023-06-01 NOTE — TELEPHONE ENCOUNTER
Called Dr. Laura Pardo. He wanted to ask if the patient needs to be on an anticoagulant for prophylaxis concerns due to the risk of DVT. Patient was seen in the hospital only.

## 2025-06-25 NOTE — PROGRESS NOTES
2200:  POC discussed with ADEBAYO Holbrook and ADEBAYO Mera. Okay for Na to be trended every 4-6 hours. Plan to d/c 3% saline when levels get close to therapeutic at 145.      0100: Patient placed on bed pan stating she needed to have a BM. BM noted and patient feels relief.
[address] [facility] Adult  Hospitalist Group                                                                                          Hospitalist Progress Note  DOLORES Franco - NP  Office Phone: [phone]        Date of Service:  2023  NAME:  Jacobo Maradiaga  :  1945  MRN:  [id number]    Admission Summary:     Ms. Jaswinder Zamora is a 65 y.o. female with a PMHx of A-fib on eliquis, T2DM, GERD, HLD, HTN, hypokalemia and hypothyroidism. Pt presented to the ED as a transfer from [facility] after [address] found on [address] with right-sided paralysis. Neurosurgery reviewed imaging and determined that  no intervention was recommended given the patient was on eliquis. Patient experienced A-fib with RVR while in the ICU and was rate controlled with diltiazem gtt. Cardiology consulted and the patient was transitioned to Amiodarone bolus and gtt as patient was previously on PO amiodarone. Hospitalist consulted for transfer out of the ICU (23). Interval history / Subjective:        Patient was seen and examined this afternoon, she was lying in bed resting, opened her eyes to voice. Her family was present at bedside. Nursing had indicated that patient had episode of upper chest discomfort during physical therapy this afternoon but very transient and has since resolved. Patient at present time does not recall having any pain at all. Family indicate that patient has had a \"good day\" has \"face timed\" with her grandchildren and great-grandchildren and recognized them and used the communication board to interact.     Assessment & Plan:     Intracerebral hemorrhage  Right sided hemiparesis  - MRI brain: Posterior cerebral intraparenchymal hemorrhage with surrounding edema and mass effect on the L lateral ventricle.    - NSGY- no surgical intervention   - appreciate Neurology input    - q4h neurochecks  - Continue keppra, atorvastatin   - SBP goal < 140, PRN hydral/labetalol  - PT/OT/SLP   - repeat CT head :
[address] [facility] Adult  Hospitalist Group                                                                                          Hospitalist Progress Note  DOLORES Ramirez - NP  Office Phone: [phone]        Date of Service:  2023  NAME:  Denton Quiroga  :  1945  MRN:  [id number]    Admission Summary:     Ms. Luz Maria Marshall is a 65 y.o. female with a PMHx of A-fib on eliquis, T2DM, GERD, HLD, HTN, hypokalemia and hypothyroidism. Pt presented to the ED as a transfer from [facility] after [address] found on CT with right-sided paralysis. Neurosurgery reviewed imaging and determined that  no intervention was recommended given the patient was on eliquis. Patient experienced A-fib with RVR while in the ICU and was rate controlled with diltiazem gtt. Cardiology consulted and the patient was transitioned to Amiodarone bolus and gtt as patient was previously on PO amiodarone. Hospitalist consulted for transfer out of the ICU (23). Interval history / Subjective:        Was alerted by nursing today that patient converted into atrial fibrillation with RVR, heart rate was 130-140. Breath dizem drip, titrate to keep heart rate less than 100 and to notify provider if converted back into normal sinus rhythm. Patient was seen and examined, she was sitting up in bed, her daughters were at bedside. Patient denies any palpitations at time of exam.  Daughters report patient is more interactive today and has been eating a bit more.     Addendum:   Pt converted back into normal sinus rhythm, cardiology has seen - discontinued Norvasc and started on immediate release Cardizem/stopped cardizem gtt    Assessment & Plan:     Intracerebral hemorrhage  Right sided hemiparesi or shortness ofs  - MRI brain: Posterior cerebral intraparenchymal hemorrhage with surrounding edema and mass effect on the L lateral ventricle.    - NSGY- no surgical intervention   - appreciate Neurology input    - q4 neurochecks  -
ATTENDING CARDIOLOGIST  The patient was personally examined and chart reviewed. All the elements of history and examination were personally performed and I agree with the plan as listed by advanced practice provider. Treatment plan was addressed with the patient. Subjective:  Back in afib    Blood pressure (!) 148/64, pulse 75, temperature 97.7 °F (36.5 °C), temperature source Axillary, resp. rate 16, height 5' 3.39\" (1.61 m), weight 223 lb (101.2 kg), SpO2 99 %. Normal rate, irregular rhythm, S1/S2  Lungs clear      A/P:  Will increase amiodarone to 400 BID, stop amlodipine and add PO dilt, short acting. [address] contraindicated. []    High complexity decision making was performed  []    Patient is at high-risk of decompensation with multiple organ involvement        Adi Herrera. Gautam Wahl MD, Fred GILBERT. Gautam Wahl MD, [address] [facility] [address]  [phone] (H)  [phone] (F)                                        [employer] [facility]  [facility] [address]      Cardiology Consult Note                      []Initial visit     [x]Established visit     Patient Name: Jacobo Maradiaga - KVK:6/61/4649 - KPQ:[id number]  Consulting Cardiologist: Dax Chase MD       Reason for initial visit: Atrial fibrillation with RVR      HPI:     Jacobo Maradiaga is a 65 y.o. female history of atrial fibrillation on Eliquis (followed by Radhika Welsh MD, S/P PVI last week), diabetes, GERD, high cholesterol, hypertension, hypokalemia, and thyroid disease who presented 5/21/23 to the [facility] [place] ED as a transfer from [facility] after IPH found on CT with right-sided paralysis. Onset was at 11:00 in the morning. She also had a mild headache. Neurosurgery   consulted -reviewed imaging. No neurosurgical intervention recommended at this time as patient was on Eliquis. Blood pressure control.   She was given Kcentra at outside hospital for
ATTENDING CARDIOLOGIST  The patient was personally examined and chart reviewed. All the elements of history and examination were personally performed and I agree with the plan as listed by advanced practice provider. Treatment plan was addressed with the patient. Subjective:  Prepping for rehab  Back in NSR    Blood pressure (!) 133/56, pulse 68, temperature 98 °F (36.7 °C), temperature source Oral, resp. rate 19, height 5' 3.39\" (1.61 m), weight 223 lb (101.2 kg), SpO2 96 %. Normal rate, regular rhythm, S1/S2  Lungs clear      A/P:  Atrial fibrillation with RVR - s/p PVI/CTI, 5/10/23 admitted with ICH post ablation. Intermittent PAF, as escalated amiodarone to 200mg po BID, dilt 30mg po q6. [address] [facility] contraindicated. BP controlled. Will signoff, call with questions. []    High complexity decision making was performed  []    Patient is at high-risk of decompensation with multiple organ involvement        Brandy Bravo. Kojo Olivares MD, Mattie GILBERT. Kojo Olivares MD, [address] [facility] [address]  [phone] (K)  [phone] (F)                                     [facility]  [facility]. [address]      Cardiology Consult Note                      []Initial visit     [x]Established visit     Patient Name: George Arevalo - JNJ:2/61/8327 - WBP:[id number]  Consulting Cardiologist: Courtney Sosa MD       Reason for initial visit: Atrial fibrillation with RVR      HPI:     George Arevalo is a 65 y.o. female history of atrial fibrillation on Eliquis (followed by Carl Genao MD, S/P PVI last week), diabetes, GERD, high cholesterol, hypertension, hypokalemia, and thyroid disease who presented 5/21/23 to the [facility] [place] ED as a transfer from [facility] after IPH found on CT with right-sided paralysis. Onset was at 11:00 in the morning. She also had a mild headache. Neurosurgery   consulted -reviewed imaging.   No neurosurgical
Khadijah Knowles is a 65 y.o. female  with atrial fibrillation, status post recent cardiac ablation on anticoagulation with Eliquis, diabetes, dyslipidemia, hypertension who developed sudden onset of right-sided weakness. CT brain imaging shows a moderate to large size left frontal parietal lobe ICH with subdural extension. Subsequent CT this morning was stable      Clinically she has dense right hemiplegia. She remains expressively aphasic and has gaze preference to the left        EXAM  Exam:  BP (!) 165/61   Pulse 72   Temp (!) 96.6 °F (35.9 °C) (Axillary)   Resp 17   Ht 5' 3.39\" (1.61 m)   Wt 223 lb (101.2 kg)   SpO2 95%   BMI 39.02 kg/m²   Gen:  CV: RRR  Lungs: non labored breathing  Abd: non distending  Neuro: Alert and awake. Expressive aphasia. Seems to understand and follow commands. CN II-XII: PERRL, EOMI, face symmetric, tongue/palate midline  Motor: strength 0/5 in the right upper extremity, 0-1/5 in the right lower extremity with trace withdrawal seen to stimulation. Normal strength in the left side  Sensory: intact to LT  Gait: Deferred    LABS/ IMAGING  CT Result (most recent):  CT HEAD WO CONTRAST 05/24/2023    Narrative  EXAM: CT HEAD WO CONTRAST    INDICATION: f/u ICH    COMPARISON: 5/22/2023    TECHNIQUE: Routine CT of the head was performed without intravenous contrast.  Coronal and sagittal reconstructions were generated. CT dose reduction was  achieved through use of a standardized protocol tailored for this examination  and automatic exposure control for dose modulation. FINDINGS:  Ventricular size and configuration appear stable. The large parenchymal hemorrhage in the medial left parietal lobe is again  noted. It measures 6.7 x 2.8 cm. Previous measuring 6.4 x 2.9 cm. There is  surrounding white matter edema which also appears similar to the prior study. There is midline shift towards the right measuring 5 mm. This is also stable.   A thin left
Name: Eugenio Payne   : 1945   MRN: [id number]   Date: 2023      REASON FOR ICU ADMISSION: Intraparenchymal hemorrhage    PRINCIPLE ICU DIAGNOSIS   Intraparenchymal hemorrhage with associated SAH  A-fib on Eliquis at home given [facility] in ED  GERD  Thyroid disease  DM    PATIENT SUMMARY   Eugenio Payne is a 65 y.o. female history of atrial fibrillation on Eliquis, diabetes, GERD, high cholesterol, hypertension, hypokalemia, and thyroid disease who presents to the [facility] ED as a transfer from [facility] after IPH found on CT with right-sided paralysis. Onset was at 11:00 in the morning. She also had a mild headache. Neurosurgery has been consulted reviewed imaging. No neurosurgical intervention recommended at this time as patient was on Eliquis. Blood pressure control. Neurology consulted to follow. Patient was transferred by helicopter to [facility]. While in the ED patient is awake alert no acute distress right-sided paralysis. Hypotensive started on Cardene for blood pressure dropped quickly so this was stopped. Will order as needed labetalol/hydralazine as needed. Goal to keep blood pressure under 140 mmHg. Patient does have a history of diabetes. COMPREHENSIVE ASSESSMENT & PLAN:SYSTEM BASED     24 HOUR EVENTS: As above    NEUROLOGICAL:   Intraparenchymal hemorrhage with associated SAH  Analgesia: Tylenol  Sedation: None  Neuro check Frequency: Q 4 hrs   Neurosurgery and Neurology following  [facility]   Lipid panel/hemoglobin A1c  PT OT and speech to see  MRI per neurology when able   Head CT for any significant neurological change or decline. S/p Hypertonic saline for cerebral edema - goal sodium 145-150 - no further neuro decline - dc today  Hold Eliquis     PULMONOLOGY:   Respiratory Goals: Nasal cannula as needed to keep sats greater than 92%  Patient does have risk of respiratory decline and intubation if mental status declines.     CARDIOVASCULAR:   Hypertension  Atrial
Name: Jatin Handley   : 1945   MRN: [id number]   Date: 2023      REASON FOR ICU ADMISSION: Intraparenchymal hemorrhage    PRINCIPLE ICU DIAGNOSIS   Intraparenchymal hemorrhage with associated SAH  A-fib on Eliquis at home given [facility] in ED  GERD  Thyroid disease  DM    PATIENT SUMMARY   Jatin Handley is a 65 y.o. female history of atrial fibrillation on Eliquis, diabetes, GERD, high cholesterol, hypertension, hypokalemia, and thyroid disease who presents to the [facility] ED as a transfer from [facility] after IPH found on CT with right-sided paralysis. Onset was at 11:00 in the morning. She also had a mild headache. Neurosurgery has been consulted reviewed imaging. No neurosurgical intervention recommended at this time as patient was on Eliquis. Blood pressure control. Neurology consulted to follow. Patient was transferred by helicopter to [facility]. While in the ED patient is awake alert no acute distress right-sided paralysis. Hypotensive started on Cardene for blood pressure dropped quickly so this was stopped. Will order as needed labetalol/hydralazine as needed. Goal to keep blood pressure under 140 mmHg. Patient does have a history of diabetes. COMPREHENSIVE ASSESSMENT & PLAN:SYSTEM BASED     24 HOUR EVENTS: As above    NEUROLOGICAL:   Intraparenchymal hemorrhage with associated SAH  Analgesia: Tylenol  Sedation: None  Neuro check Frequency: Hourly    Neurosurgery and Neurology consulted. No surgical recs and to start on Keppra per neurosurgery - neurology NP spoke with [facility] here at [facility]  Echo  Lipid panel/hemoglobin A1c  PT OT and speech to see  MRI per neurology when able   Head CT for any significant neurological change or decline.   Hypertonic saline for cerebral edema - goal sodium 145-150    PULMONOLOGY:   Respiratory Goals: Nasal cannula as needed to keep sats greater than 92%  Patient does have risk of respiratory decline and intubation if mental status
Neuro Critical Care Brief Progress Note    Subjective:    I spoke with family at the beginning of shift about the results of the MRI and why we were starting her on 3% Na to try to manage potential swelling in the face of evolving clinical symptoms despite imaging revealing stable findings. MRI 05/21/23: The posterior cerebral intraparenchymal hemorrhage with surrounding edema and mass effect on the left lateral ventricle. This is not significantly changed. Patient woke up in pain and having severe muscle cramps to her left thigh. She was alert, but unable to answer any questions other than saying yes when asked if she was in pain. Primary nurse provided heating pad, placed her on her right side to get her off the affected muscle. 2 gm of Magnesium and 4 doses of 10 meq K. Her Magnesium was 1.1 and her K was 3.2    Repeat head CT scheduled for 4 am appears to show increased edema and sodium has remained stable at 140 since initiation of 3%. Rate has been increased from 25 ml/hr to 50 ml/hr and we will continue to recheck sodium every 2 hours. Primary nurse states patient responses to neuro status checks have improved with her verbal responses being quicker and more clear.            Signed By: DOLORES Dickson NP, Neurocritical Care Nurse Practitioner    May 22, 2023
Neurology Progress Note  Holley GurpreetDOLORES shafer - MARTINEZ    Admit Date: 5/21/2023   LOS: 2 days      Daily Progress Note: 5/23/2023      C/C:   Right-sided weakness     HPI:   Juvenal Mann is a 65 y.o. female with a pmh of A. Fib s/p recent cardiac ablation last Monday on Eliquis, Diabetes, GERD, high cholesterol, hypertension, and thyroid disease who initially presented to [facility] ED on 05/21 with an acute onset of right-sided weakness. Patient was at home when she suddenly became weak in her recliner per family report. She was found to have right-sided weakness and she was brought to the ED for evaluation. Head CT showed a left frontoparietal lobe ICH with subdural extension along the falx. She and her family member denies her having any recent trauma or falls. She takes Eliquis at home and no other blood thinners. She was given Kcentra at outside hospital for anticoagulation reversal. Yessy Dyer was consulted with no plans for any neurosurgical intervention at this time. She was transferred to [facility] for higher level of care. She has no prior history of a stroke. Patient lives with her sister, grandson, and nephew. She is able to ambulate without assistance and drives. Of note, patient's niece reported after pt ablation the next day, the patient started having cramping on her chest and shoulder blades and she was prescribed a medication, but she is unsure of the name. SUBJECTIVE:   No acute events noted overnight. The pt remained on 3% saline at 50ml/hr. Current N+ is 141. Repeat [facility] 05/22 showed stable intracranial hemorrhage. Low phos 2.0 repleted by primary team. Pt neuro exams stable. Daughter at the bedside.      No Known Allergies      Past Medical History:   Diagnosis Date    Atrial fibrillation (HCC)     Diabetes ([address]     GERD (gastroesophageal reflux disease)     High cholesterol     Hypertension     Hypokalemia     Thyroid disease      Family History   Problem Relation Age of Onset    COPD Father     Cancer
Occupational Therapy      Completed chart review and attempted to see patient for OT. Patient preparing to go for routine CT scan. Will continue to follow.      Thank you,    Angi Tam, OT
[facility] to [facility] transport with hospital home transport RN called report to [facility].
Physical Therapy 5/24/23    Chart reviewed, attempted to see patient. Patient in process of leaving floor for CT scan. Will defer and follow up as able/appropriate.     Thank you for your consideration,    Aime Prince, PT, DPT
Physician Progress Note      PATIENT:               Esther House  CSN #:                  [id number]  :                       1945  ADMIT DATE:       2023 1:41 PM  [address] DATE:  RESPONDING  PROVIDER #:        Shantel New MD          QUERY TEXT:    Dear Dr Eber Javed,  Originally sent query to [address] [place] [address], but he deferred to ICU NP. Now that pt   is out of ICU, sending to hospitalist.    Pt admitted with Intraparenchymal hemorrhage of brain. Pt noted to have   \"subfalcine herniation\" on CTA. If clinically significant, please document   in progress notes and discharge summary if you are evaluating/treating any of   the following: The medical record reflects the following:  Risk Factors: Afib on Eliquis, HTN, HLD, DM    Clinical Indicators:    CTA H/N-  Slight increase in size of medial left superior parietal acute parenchymal   hemorrhage measuring 6.6 x 2.7 cm, compared to 5.6 x 2.4 cm. Mild surrounding   vasogenic edema. Right-sided 5 mm subfalcine herniation. The basal cisterns   are patent. No hydrocephalus. Joshua PN -  Spoke with Dr. Flor Saenz and given worsening exam will plan to start 3%   hypertonic saline for cerebral edema    Treatment: Hydralazine; Keppra; Cardene gtt; 3% NS; CTA; MRI    Thank you,  Karen Olivarez RN, BSN, CRCR, CCDS, SMART  Clinical Documentation Improvement  Silvestremaame [email or web address]. [email or web address]  [phone] or via [employer]  Options provided:  -- Brain compression  -- Other - I will add my own diagnosis  -- Disagree - Not applicable / Not valid  -- Disagree - Clinically unable to determine / Unknown  -- Refer to Clinical Documentation Reviewer    PROVIDER RESPONSE TEXT:    This patient has brain compression. Query created by: Constance Marlow on 2023 1:26 PM      QUERY TEXT:    Dear Dr Eber Javed,  Originally sent query to [address] [place] [address], but he deferred to ICU NP.   Now that pt   is out of ICU, sending to hospitalist.    Pt admitted with intraparenchymal
Pt complaining of shortness of breath. Increase work of breathing. Applied 2L nc. O2 saturation went from 91 to 96% on 2L nc. Lung fields diminished with expiratory wheezing. Notified Iman anthony np. New orders received.
Spiritual Care Assessment/Progress Note  [address]    Name: Willam Christie MRN: [id number]    Age: 65 y.o. Sex: female   Language: English     Date: 5/22/2023            Total Time Calculated: 12 min              Spiritual Assessment begun in [address] [facility] [address] Provided For[de-identified] Patient not available     Encounter Overview/Reason : Initial Encounter    Spiritual beliefs:      [] Involved in a laura tradition/spiritual practice:      [] Supported by a laura community:      [] Claims no spiritual orientation:      [] Seeking spiritual identity:           [] Adheres to an individual form of spirituality:      [x] Not able to assess:                Identified resources for coping and support system:           [] Prayer                  [] Devotional reading               [] Music                  [] Guided Imagery     [] Pet visits                                        [] Other: (COMMENT)     Specific area/focus of visit   Encounter:    Crisis:    Spiritual/Emotional needs:    Ritual, Rites and Sacraments:    Grief, Loss, and Adjustments:    Ethics/Mediation:    Behavioral Health:    Palliative Care: Advance Care Planning:           Narrative: Attempted to visit Ms Vishnu Peterson in ICU-03; reviewed patient's chart prior to attempt. Staff members were at her bedside performing a procedure and no family member was present at that time. Left note assuring patient and family of  availability for support. Jatin Lu, [facility]  To felipe : [phone] ([phone])
Transition of Care Plan:    [facility] - [facility] ([id number] , [place], [address])    Liaison is Tyler Roverto [phone]; [phone]    Transport: [facility] with O2 - 5/26 - 1300 (may be adjusted pending medical stability)    RUR: 21%  Prior Level of Functioning: Patient was independent and active without use of DME. Pt still drives  Disposition: IPR  If SNF or IPR: Date FOC offered: 5/24  Date [address] received: 5/24  Accepting facility: Referral sent to [facility]  Follow up appointments: PCP, Neuro and Cardio? DME needed: To be arranged by IPR  Transportation at discharge: BLS - Pt's family willing to pay out of pocket for transport as it is out of town  IM/IMM Medicare/ letter given: 5/21    Caregiver Contact: Ky Ip [phone]  Discharge Caregiver contacted prior to discharge? Barriers to discharge:   Medical - Cardio following    [facility] has accepted Pt with anticipated discharge on Friday. They have requested Pt transport at 1300.     1534: NIRU spoke with Pt's son Girma Mathews regarding potential discharge tomorrow pending medical stability. He plans to provide payment for transport, CM informed him of [facility] as provider for [facility]. He is in agreement to have his contact info shared with [facility]. NIRU called [facility] to schedule stretcher transport for tomorrow at 1300. Pt currently on 2L NC. Quote with O2 is $1,218.70 and w/o O2 is $1,085.20. H2H to call Pt's son and provide quote this afternoon. CARI Bell.
Transition of Care Plan:    [facility] - [facility] ([address], [place], [address] [facility]) - 5/27    RN will call report to #[phone]; Room #125. CM will fax DC summary and vitals when available to [phone]. CM to call Pt's son Britt Diggs morning of 5/27. CM to call sujatha Osbornison with with [facility] tomorrow morning 5/27 [phone] to confirm DC. Liaison is April [facility] [facility] [phone]; [phone]    Transport: [facility] to [facility] with [facility] - 5/27 - 1300  - Transport packet on chart    RUR: 22%  Prior Level of Functioning: Patient was independent and active without use of DME. Pt still drives  Disposition: IPR  If SNF or IPR: Date FOC offered: 5/24  Date [address] received: 5/24  Accepting facility: [facility]  Follow up appointments: PCP, Neuro and Cardio? DME needed: To be arranged by IPR  Transportation at discharge: Hospital to home stretcher - paid for out of pocket by Pt's son, Britt Diggs  IM/IMM Medicare/ letter given: 5/21    Caregiver Contact: Char Jane [phone]  Discharge Caregiver contacted prior to discharge? Barriers to discharge:   Medical - Cardio following    Pt case discussed with Hospitalist MARTINEZ Knight, plan for discharge tomorrow 5/27 if Pt remains stable. Hospital to Home transport scheduled 5/27 for 1300. NIRU spoke with patient's son, Britt Diggs, regarding potential discharge tomorrow, he is  in agreement. CM spoke with Monica dhillon, April [facility] who confirmed they would be able to accept patient 5/27 at 1300. CM sent updated vitals and labs via email to  Ferevo Marrero. [email or web address] will call report to #[phone]; Room #125. CM will fax DC summary when available to [phone]. DAQUAN Aranda
Transition of Care Plan:    IPR - Referral sent to [facility] ([address] [facility] [address], [place], [address] [facility])    Liaison is Diana Pratt [phone]    Transport: [address] - pt's son will pay out of pocket    RUR: 21%  Prior Level of Functioning: Patient was independent and active without use of DME. Pt still drives  Disposition: IPR  If SNF or IPR: Date FOC offered: 5/24  Date [address] received: 5/24  Accepting facility: Referral sent to [facility]  Follow up appointments: PCP, Neuro and Cardio? DME needed: To be arranged by IPR  Transportation at discharge: BLS - Pt's family willing to pay out of pocket for transport as it is out of town  IM/IMM Medicare/ letter given: 5/21    Caregiver Contact: Marielleterry Suarez [phone]  Discharge Caregiver contacted prior to discharge? Barriers to discharge:   Medical - Cardio following  Placement - Referral pending with [facility] - no auth needed. PT/OT and Dr. Larry Jimenez recommending IPR at discharge. CM spoke with patient's son, Mic Aguila regarding rehab choice. He prefers [facility] in [address] [place] [address] Roshan Lucero Friday, M.S.WChun
supple, no JVD, no meningeal signs  Chest: decreased breath sounds at bases   CVS: S1 S2 heard, Capillary refill less than 2 seconds  Abd: soft/ non tender, non distended, BS physiological,   Ext: no clubbing, no cyanosis, no edema, brisk 2+ DP pulses  Neuro/Psych: expressive aphasia. strength 0/5 in the right  and lower upper extremity  Skin: warm     Data Review:    I personally reviewed  Image      I have personally and independently reviewed all pertinent labs, diagnostic studies, imaging, and have provided independent interpretation of the same. Labs:     Recent Labs     05/23/23  0301 05/24/23  0143   WBC 6.1 7.6   HGB 9.8* 10.0*   HCT 32.6* 32.5*    300     Recent Labs     05/22/23  1513 05/22/23  1928 05/23/23  0301 05/23/23  0904 05/24/23  0143     142   < > 144 145 140   K 3.9  --  3.7  --  3.6   *  --  114*  --  108   CO2 26  --  25  --  26   BUN 8  --  9  --  10   MG 2.0  --  2.3  --  2.0   PHOS 2.0*  --  2.1*  --  2.0*    < > = values in this interval not displayed. Recent Labs     05/22/23  0504 05/22/23  1513   ALT 27 26   GLOB 3.7 3.4     No results for input(s): INR, APTT in the last 72 hours. Invalid input(s): PTP   No results for input(s): TIBC, FERR in the last 72 hours. Invalid input(s): FE, PSAT   No results found for: FOL, RBCF   No results for input(s): PH, PCO2, PO2 in the last 72 hours. No results for input(s): CPK in the last 72 hours. Invalid input(s): CPKMB, CKNDX, TROIQ  Lab Results   Component Value Date/Time    CHOL 93 05/22/2023 05:04 AM    HDL 44 05/22/2023 05:04 AM     No results found for: 4295  Carli Ayo  [unfilled]    Notes reviewed from all clinical/nonclinical/nursing services involved in patient's clinical care. Care coordination discussions were held with appropriate clinical/nonclinical/ nursing providers based on care coordination needs.          Patients current active Medications were reviewed, considered, added and adjusted based on the
IntraVENous Continuous Martina H VANI Zhang 16.7 mL/hr at 05/23/23 1917 0.5 mg/min at 05/23/23 1917    lansoprazole (PREVACID SOLUTAB) disintegrating tablet 30 mg  30 mg Oral QAM  Joel Ratliff NP-C   30 mg at 05/24/23 3281    ipratropium 0.5 mg-albuterol 2.5 mg (DUONEB) nebulizer solution 1 ampule  1 ampule Inhalation Q4H PRN DOLORES Allison - MARTINEZ   1 ampule at 05/23/23 2122    sodium chloride flush 0.9 % injection 5-40 mL  5-40 mL IntraVENous 2 times per day Joel Ratliff NP-C   10 mL at 05/23/23 2123    sodium chloride flush 0.9 % injection 5-40 mL  5-40 mL IntraVENous PRN Joel Ratliff NP-C        0.9 % sodium chloride infusion   IntraVENous PRN Joel Ratliff NP-C        levETIRAcetam (KEPPRA) injection 500 mg  500 mg IntraVENous Q12H DOLORES Fragoso - NP   500 mg at 05/24/23 8900    acetaminophen (TYLENOL) tablet 650 mg  650 mg Oral Q4H PRN Joel Ratliff NP-C   650 mg at 05/24/23 0212    ondansetron (ZOFRAN-ODT) disintegrating tablet 4 mg  4 mg Oral Q8H PRN Joel Ratliff NP-C        Or    ondansetron (ZOFRAN) injection 4 mg  4 mg IntraVENous Q6H PRN Joel Ratliff NP-C        levothyroxine (SYNTHROID) tablet 50 mcg  50 mcg Oral QAM  Joel Ratliff NP-C   50 mcg at 05/24/23 2137    atorvastatin (LIPITOR) tablet 20 mg  20 mg Oral Daily Joel Ratliff NP-C   20 mg at 05/23/23 0846    insulin lispro (HUMALOG) injection vial 0-8 Units  0-8 Units SubCUTAneous Q6H Adal Morgan NP-SEKOU        glucose chewable tablet 16 g  4 tablet Oral PRN Joel Ratliff, NP-C        dextrose bolus 10% 125 mL  125 mL IntraVENous PRN Joel Ratliff NP-C        Or    dextrose bolus 10% 250 mL  250 mL IntraVENous PRN Joel Ratliff NP-C        glucagon injection 1 mg  1 mg SubCUTAneous PRN ROSEMARY Gomez        dextrose 10 % infusion   IntraVENous Continuous PRN ROSEMARY Gomez        hydrALAZINE (APRESOLINE) injection 10 mg  10 mg IntraVENous Q6H PRN Gilberto Gentile
Patient expressed no known problems or needs

## (undated) DEVICE — SYR MED LUER LCK 6ML YEL --

## (undated) DEVICE — INTRODUCER SHTH SL1 0.032 IN 8.5 FRX63 CM FAST-CATH

## (undated) DEVICE — STERILE (15.2 TAPERED TO 7.6 X 183CM) POLYETHYLENE ACCORDION-FOLDED COVER FOR USE WITH SIEMENS ACUNAV ULTRASOUND CATHETER FAMILY CONNECTOR: Brand: SWIFTLINK TRANSDUCER COVER

## (undated) DEVICE — SHEATH GUID 115X85FR L71MM SM CRV L17MM BIDIR STEER CARTO

## (undated) DEVICE — CATH 5F 100CM JL35 -- DXTERITY

## (undated) DEVICE — CATHETER EP 7FR L115CM 2-8-2MM SPC TIP 2MM DF CRV ADV COMPR

## (undated) DEVICE — DEVICE COMPR LNG 27 CM VASC BND

## (undated) DEVICE — PATCH REF EXT FOR CARTO 3 SYS (EA = 6 PACKS)

## (undated) DEVICE — 3M™ STERI-DRAPE™ SMALL DRAPE WITH ADHESIVE APERTURE 1092 25/BX,4/CS&#X20;: Brand: STERI-DRAPE™

## (undated) DEVICE — 3 ML SYRINGE WITH HYPODERMIC SAFETY NEEDLE: Brand: MONOJECT

## (undated) DEVICE — SURGICAL PROCEDURE TRAY CRD CATH 3 PRT

## (undated) DEVICE — CATH 5F 100CM JR40 -- DXTERITY

## (undated) DEVICE — ELECTRODE PT RET AD L9FT HI MOIST COND ADH HYDRGEL CORDED

## (undated) DEVICE — MEDI-TRACE CADENCE ADULT, DEFIBRILLATION ELECTRODE -RTS  (10 PR/PK) - PHYSIO-CONTROL: Brand: MEDI-TRACE CADENCE

## (undated) DEVICE — 3M™ WARMING BLANKET, UPPER BODY, 10 PER CASE, 42268: Brand: BAIR HUGGER™

## (undated) DEVICE — CATHETER ABLAT 8FR L115CM 1-6-2MM SPC TIP 3.5MM DF CRV

## (undated) DEVICE — 3M™ TEGADERM™ TRANSPARENT FILM DRESSING FRAME STYLE, 1626W, 4 IN X 4-3/4 IN (10 CM X 12 CM), 50/CT 4CT/CASE: Brand: 3M™ TEGADERM™

## (undated) DEVICE — CATHETER US 8FR L90CM FOR SIEMENS SEQUOIA CYPRESS ACUSON

## (undated) DEVICE — CATHETER EP 7FR L115CM 2-6-2MM SPC 22 ELECTRD F CRV

## (undated) DEVICE — TUBING PMP FOR CARTO SYS SMARTABLATE

## (undated) DEVICE — HI-TORQUE BALANCE MIDDLEWEIGHT GUIDE WIRE .014 STRAIGHT TIP 3.0 CM X 190 CM: Brand: HI-TORQUE BALANCE MIDDLEWEIGHT

## (undated) DEVICE — DRESSING HEMOSTATIC SFT INTVENT W/O SLT DBL WRP QUIKCLOT LF

## (undated) DEVICE — PINNACLE INTRODUCER SHEATH: Brand: PINNACLE

## (undated) DEVICE — 48" PROBE COVER W/GEL, ULTRASOUND, STERILE: Brand: SITE-RITE

## (undated) DEVICE — GUIDEWIRE TRANSSEPTAL .014INX135CM SAFESEPT

## (undated) DEVICE — SYSTEM CLOSURE 6-12 FR VEN VASC VASCADE MVP

## (undated) DEVICE — SHEATH INTRO 8.5FR L71CM 8.5FR DIL GWIRE L180CM DIA0.032IN

## (undated) DEVICE — GDWIRE COR J 3MM .035IN 260CM --

## (undated) DEVICE — Device

## (undated) DEVICE — TUBING CNTRST INJ HI PRESSURE 108 IN

## (undated) DEVICE — TOWEL,OR,DSP,ST,BLUE,DLX,6/PK,12PK/CS: Brand: MEDLINE

## (undated) DEVICE — BAND RADIAL COMPR ARTERY 24CM -- REG BX/10

## (undated) DEVICE — SC 3W MP RA OFF PB - PG: Brand: NAMIC

## (undated) DEVICE — PRESSURE MONITORING SET: Brand: TRUWAVE

## (undated) DEVICE — ADAPTER IV TBNG CLR POLYCARB DBL M LUERLOCK

## (undated) DEVICE — NEEDLE TRANSSEPTAL AD 18GA L71CM 30DEG BVL BRK-1 XS CRV S

## (undated) DEVICE — GLIDESHEATH SLENDER NITINOL HYDROPHILIC COATED INTRODUCER SHEATH: Brand: GLIDESHEATH SLENDER

## (undated) DEVICE — GLIDESHEATH SLENDER STAINLESS STEEL KIT: Brand: GLIDESHEATH SLENDER